# Patient Record
Sex: MALE | Race: WHITE | NOT HISPANIC OR LATINO | Employment: FULL TIME | URBAN - METROPOLITAN AREA
[De-identification: names, ages, dates, MRNs, and addresses within clinical notes are randomized per-mention and may not be internally consistent; named-entity substitution may affect disease eponyms.]

---

## 2019-04-25 ENCOUNTER — COMMUNICATION - HEALTHEAST (OUTPATIENT)
Dept: INTERNAL MEDICINE | Facility: CLINIC | Age: 44
End: 2019-04-25

## 2019-05-02 ENCOUNTER — OFFICE VISIT - HEALTHEAST (OUTPATIENT)
Dept: INTERNAL MEDICINE | Facility: CLINIC | Age: 44
End: 2019-05-02

## 2019-05-02 ENCOUNTER — COMMUNICATION - HEALTHEAST (OUTPATIENT)
Dept: TELEHEALTH | Facility: CLINIC | Age: 44
End: 2019-05-02

## 2019-05-02 DIAGNOSIS — E56.9 VITAMIN DEFICIENCY: ICD-10-CM

## 2019-05-02 DIAGNOSIS — R53.83 FATIGUE, UNSPECIFIED TYPE: ICD-10-CM

## 2019-05-02 DIAGNOSIS — E03.9 HYPOTHYROIDISM, UNSPECIFIED TYPE: ICD-10-CM

## 2019-05-02 DIAGNOSIS — M79.18 MUSCLE ACHE OF EXTREMITY: ICD-10-CM

## 2019-05-02 DIAGNOSIS — R73.01 ELEVATED FASTING BLOOD SUGAR: ICD-10-CM

## 2019-05-02 DIAGNOSIS — Z00.00 ANNUAL PHYSICAL EXAM: ICD-10-CM

## 2019-05-02 LAB
ALBUMIN SERPL-MCNC: 4.9 G/DL (ref 3.5–5)
ALBUMIN UR-MCNC: NEGATIVE MG/DL
ALP SERPL-CCNC: 42 U/L (ref 45–120)
ALT SERPL W P-5'-P-CCNC: 69 U/L (ref 0–45)
ANION GAP SERPL CALCULATED.3IONS-SCNC: 18 MMOL/L (ref 5–18)
APPEARANCE UR: CLEAR
AST SERPL W P-5'-P-CCNC: 26 U/L (ref 0–40)
BASOPHILS # BLD AUTO: 0 THOU/UL (ref 0–0.2)
BASOPHILS NFR BLD AUTO: 1 % (ref 0–2)
BILIRUB SERPL-MCNC: 0.5 MG/DL (ref 0–1)
BILIRUB UR QL STRIP: NEGATIVE
BUN SERPL-MCNC: 13 MG/DL (ref 8–22)
CALCIUM SERPL-MCNC: 10.7 MG/DL (ref 8.5–10.5)
CHLORIDE BLD-SCNC: 105 MMOL/L (ref 98–107)
CHOLEST SERPL-MCNC: 315 MG/DL
CO2 SERPL-SCNC: 18 MMOL/L (ref 22–31)
COLOR UR AUTO: YELLOW
CREAT SERPL-MCNC: 0.94 MG/DL (ref 0.7–1.3)
EOSINOPHIL # BLD AUTO: 0.1 THOU/UL (ref 0–0.4)
EOSINOPHIL NFR BLD AUTO: 1 % (ref 0–6)
ERYTHROCYTE [DISTWIDTH] IN BLOOD BY AUTOMATED COUNT: 13.2 % (ref 11–14.5)
FASTING STATUS PATIENT QL REPORTED: ABNORMAL
GFR SERPL CREATININE-BSD FRML MDRD: >60 ML/MIN/1.73M2
GLUCOSE BLD-MCNC: 110 MG/DL (ref 70–125)
GLUCOSE UR STRIP-MCNC: NEGATIVE MG/DL
HBA1C MFR BLD: 5.3 % (ref 3.5–6)
HCT VFR BLD AUTO: 46.5 % (ref 40–54)
HDLC SERPL-MCNC: 49 MG/DL
HGB BLD-MCNC: 15.6 G/DL (ref 14–18)
HGB UR QL STRIP: NEGATIVE
KETONES UR STRIP-MCNC: NEGATIVE MG/DL
LDLC SERPL CALC-MCNC: 237 MG/DL
LEUKOCYTE ESTERASE UR QL STRIP: NEGATIVE
LYMPHOCYTES # BLD AUTO: 1.4 THOU/UL (ref 0.8–4.4)
LYMPHOCYTES NFR BLD AUTO: 29 % (ref 20–40)
MAGNESIUM SERPL-MCNC: 2.1 MG/DL (ref 1.8–2.6)
MCH RBC QN AUTO: 27.7 PG (ref 27–34)
MCHC RBC AUTO-ENTMCNC: 33.5 G/DL (ref 32–36)
MCV RBC AUTO: 82 FL (ref 80–100)
MONOCYTES # BLD AUTO: 0.5 THOU/UL (ref 0–0.9)
MONOCYTES NFR BLD AUTO: 10 % (ref 2–10)
NEUTROPHILS # BLD AUTO: 2.8 THOU/UL (ref 2–7.7)
NEUTROPHILS NFR BLD AUTO: 59 % (ref 50–70)
NITRATE UR QL: NEGATIVE
PH UR STRIP: 7 [PH] (ref 5–8)
PLATELET # BLD AUTO: 274 THOU/UL (ref 140–440)
PMV BLD AUTO: 10.1 FL (ref 8.5–12.5)
POTASSIUM BLD-SCNC: 4.8 MMOL/L (ref 3.5–5)
PROT SERPL-MCNC: 7.8 G/DL (ref 6–8)
RBC # BLD AUTO: 5.64 MILL/UL (ref 4.4–6.2)
SODIUM SERPL-SCNC: 141 MMOL/L (ref 136–145)
SP GR UR STRIP: 1.01 (ref 1–1.03)
T3 SERPL-MCNC: 87 NG/DL (ref 45–175)
T4 FREE SERPL-MCNC: 0.9 NG/DL (ref 0.7–1.8)
T4 TOTAL - HISTORICAL: 7.4 UG/DL (ref 4.5–13)
TRIGL SERPL-MCNC: 144 MG/DL
TSH SERPL DL<=0.005 MIU/L-ACNC: 0.07 UIU/ML (ref 0.3–5)
UROBILINOGEN UR STRIP-ACNC: NORMAL
VIT B12 SERPL-MCNC: 1001 PG/ML (ref 213–816)
WBC: 4.8 THOU/UL (ref 4–11)

## 2019-05-02 ASSESSMENT — MIFFLIN-ST. JEOR: SCORE: 1771.13

## 2019-05-03 ENCOUNTER — COMMUNICATION - HEALTHEAST (OUTPATIENT)
Dept: INTERNAL MEDICINE | Facility: CLINIC | Age: 44
End: 2019-05-03

## 2019-05-03 ENCOUNTER — AMBULATORY - HEALTHEAST (OUTPATIENT)
Dept: INTERNAL MEDICINE | Facility: CLINIC | Age: 44
End: 2019-05-03

## 2019-05-03 DIAGNOSIS — E03.9 HYPOTHYROIDISM, UNSPECIFIED TYPE: ICD-10-CM

## 2019-05-03 DIAGNOSIS — E78.00 HYPERCHOLESTEREMIA: ICD-10-CM

## 2019-05-03 DIAGNOSIS — R79.89 ELEVATED LFTS: ICD-10-CM

## 2019-05-03 DIAGNOSIS — E83.52 SERUM CALCIUM ELEVATED: ICD-10-CM

## 2019-05-03 LAB
25(OH)D3 SERPL-MCNC: 19.9 NG/ML (ref 30–80)
25(OH)D3 SERPL-MCNC: 19.9 NG/ML (ref 30–80)

## 2019-07-15 ENCOUNTER — COMMUNICATION - HEALTHEAST (OUTPATIENT)
Dept: INTERNAL MEDICINE | Facility: CLINIC | Age: 44
End: 2019-07-15

## 2019-07-25 ENCOUNTER — AMBULATORY - HEALTHEAST (OUTPATIENT)
Dept: LAB | Facility: CLINIC | Age: 44
End: 2019-07-25

## 2019-07-25 DIAGNOSIS — E03.9 HYPOTHYROIDISM, UNSPECIFIED TYPE: ICD-10-CM

## 2019-07-25 DIAGNOSIS — R79.89 ELEVATED LFTS: ICD-10-CM

## 2019-07-25 DIAGNOSIS — E83.52 SERUM CALCIUM ELEVATED: ICD-10-CM

## 2019-07-25 DIAGNOSIS — E78.00 HYPERCHOLESTEREMIA: ICD-10-CM

## 2019-07-25 LAB
ALBUMIN SERPL-MCNC: 4.4 G/DL (ref 3.5–5)
ALP SERPL-CCNC: 57 U/L (ref 45–120)
ALT SERPL W P-5'-P-CCNC: 54 U/L (ref 0–45)
ANION GAP SERPL CALCULATED.3IONS-SCNC: 9 MMOL/L (ref 5–18)
AST SERPL W P-5'-P-CCNC: 48 U/L (ref 0–40)
BILIRUB SERPL-MCNC: 0.4 MG/DL (ref 0–1)
BUN SERPL-MCNC: 16 MG/DL (ref 8–22)
CALCIUM SERPL-MCNC: 9.7 MG/DL (ref 8.5–10.5)
CHLORIDE BLD-SCNC: 102 MMOL/L (ref 98–107)
CO2 SERPL-SCNC: 26 MMOL/L (ref 22–31)
CREAT SERPL-MCNC: 1 MG/DL (ref 0.7–1.3)
GFR SERPL CREATININE-BSD FRML MDRD: >60 ML/MIN/1.73M2
GLUCOSE BLD-MCNC: 114 MG/DL (ref 70–125)
LDLC SERPL CALC-MCNC: 209 MG/DL
POTASSIUM BLD-SCNC: 4.4 MMOL/L (ref 3.5–5)
PROT SERPL-MCNC: 7 G/DL (ref 6–8)
PTH-INTACT SERPL-MCNC: 86 PG/ML (ref 10–86)
SODIUM SERPL-SCNC: 137 MMOL/L (ref 136–145)
TSH SERPL DL<=0.005 MIU/L-ACNC: 1.5 UIU/ML (ref 0.3–5)

## 2019-10-19 ENCOUNTER — COMMUNICATION - HEALTHEAST (OUTPATIENT)
Dept: INTERNAL MEDICINE | Facility: CLINIC | Age: 44
End: 2019-10-19

## 2019-10-19 DIAGNOSIS — L30.9 DERMATITIS: ICD-10-CM

## 2019-11-24 ENCOUNTER — COMMUNICATION - HEALTHEAST (OUTPATIENT)
Dept: INTERNAL MEDICINE | Facility: CLINIC | Age: 44
End: 2019-11-24

## 2019-11-25 ENCOUNTER — AMBULATORY - HEALTHEAST (OUTPATIENT)
Dept: INTERNAL MEDICINE | Facility: CLINIC | Age: 44
End: 2019-11-25

## 2019-11-25 DIAGNOSIS — R06.83 SNORING: ICD-10-CM

## 2020-02-03 ENCOUNTER — COMMUNICATION - HEALTHEAST (OUTPATIENT)
Dept: INTERNAL MEDICINE | Facility: CLINIC | Age: 45
End: 2020-02-03

## 2020-02-07 ENCOUNTER — OFFICE VISIT - HEALTHEAST (OUTPATIENT)
Dept: INTERNAL MEDICINE | Facility: CLINIC | Age: 45
End: 2020-02-07

## 2020-02-07 DIAGNOSIS — M79.18 MUSCLE ACHE OF EXTREMITY: ICD-10-CM

## 2020-02-07 DIAGNOSIS — R79.89 LOW TESTOSTERONE IN MALE: ICD-10-CM

## 2020-02-07 DIAGNOSIS — R53.83 FATIGUE, UNSPECIFIED TYPE: ICD-10-CM

## 2020-02-07 DIAGNOSIS — E55.9 VITAMIN D DEFICIENCY: ICD-10-CM

## 2020-02-07 DIAGNOSIS — R73.01 ELEVATED FASTING BLOOD SUGAR: ICD-10-CM

## 2020-02-07 DIAGNOSIS — E03.9 HYPOTHYROIDISM, UNSPECIFIED TYPE: ICD-10-CM

## 2020-02-07 LAB
ALBUMIN SERPL-MCNC: 4.4 G/DL (ref 3.5–5)
ALP SERPL-CCNC: 59 U/L (ref 45–120)
ALT SERPL W P-5'-P-CCNC: 50 U/L (ref 0–45)
ANION GAP SERPL CALCULATED.3IONS-SCNC: 8 MMOL/L (ref 5–18)
AST SERPL W P-5'-P-CCNC: 28 U/L (ref 0–40)
BASOPHILS # BLD AUTO: 0 THOU/UL (ref 0–0.2)
BASOPHILS NFR BLD AUTO: 1 % (ref 0–2)
BILIRUB SERPL-MCNC: 0.4 MG/DL (ref 0–1)
BUN SERPL-MCNC: 17 MG/DL (ref 8–22)
C REACTIVE PROTEIN LHE: 0.2 MG/DL (ref 0–0.8)
CALCIUM SERPL-MCNC: 9.7 MG/DL (ref 8.5–10.5)
CHLORIDE BLD-SCNC: 102 MMOL/L (ref 98–107)
CO2 SERPL-SCNC: 29 MMOL/L (ref 22–31)
CREAT SERPL-MCNC: 0.85 MG/DL (ref 0.7–1.3)
EOSINOPHIL # BLD AUTO: 0.1 THOU/UL (ref 0–0.4)
EOSINOPHIL NFR BLD AUTO: 2 % (ref 0–6)
ERYTHROCYTE [DISTWIDTH] IN BLOOD BY AUTOMATED COUNT: 13.1 % (ref 11–14.5)
ERYTHROCYTE [SEDIMENTATION RATE] IN BLOOD BY WESTERGREN METHOD: 5 MM/HR (ref 0–15)
GFR SERPL CREATININE-BSD FRML MDRD: >60 ML/MIN/1.73M2
GLUCOSE BLD-MCNC: 97 MG/DL (ref 70–125)
HBA1C MFR BLD: 5.2 % (ref 3.5–6)
HCT VFR BLD AUTO: 47.9 % (ref 40–54)
HGB BLD-MCNC: 15.8 G/DL (ref 14–18)
LYMPHOCYTES # BLD AUTO: 2 THOU/UL (ref 0.8–4.4)
LYMPHOCYTES NFR BLD AUTO: 30 % (ref 20–40)
MAGNESIUM SERPL-MCNC: 2.1 MG/DL (ref 1.8–2.6)
MCH RBC QN AUTO: 27.5 PG (ref 27–34)
MCHC RBC AUTO-ENTMCNC: 33 G/DL (ref 32–36)
MCV RBC AUTO: 83 FL (ref 80–100)
MONOCYTES # BLD AUTO: 0.6 THOU/UL (ref 0–0.9)
MONOCYTES NFR BLD AUTO: 9 % (ref 2–10)
NEUTROPHILS # BLD AUTO: 3.9 THOU/UL (ref 2–7.7)
NEUTROPHILS NFR BLD AUTO: 59 % (ref 50–70)
PLATELET # BLD AUTO: 280 THOU/UL (ref 140–440)
PMV BLD AUTO: 10.3 FL (ref 8.5–12.5)
POTASSIUM BLD-SCNC: 4.6 MMOL/L (ref 3.5–5)
PROT SERPL-MCNC: 7.4 G/DL (ref 6–8)
RBC # BLD AUTO: 5.74 MILL/UL (ref 4.4–6.2)
SODIUM SERPL-SCNC: 139 MMOL/L (ref 136–145)
VIT B12 SERPL-MCNC: 840 PG/ML (ref 213–816)
WBC: 6.7 THOU/UL (ref 4–11)

## 2020-02-07 ASSESSMENT — MIFFLIN-ST. JEOR: SCORE: 1784.74

## 2020-02-10 ENCOUNTER — COMMUNICATION - HEALTHEAST (OUTPATIENT)
Dept: INTERNAL MEDICINE | Facility: CLINIC | Age: 45
End: 2020-02-10

## 2020-02-10 LAB
25(OH)D3 SERPL-MCNC: 23.8 NG/ML (ref 30–80)
25(OH)D3 SERPL-MCNC: 23.8 NG/ML (ref 30–80)

## 2020-03-03 ENCOUNTER — RECORDS - HEALTHEAST (OUTPATIENT)
Dept: ADMINISTRATIVE | Facility: OTHER | Age: 45
End: 2020-03-03

## 2020-06-05 ENCOUNTER — COMMUNICATION - HEALTHEAST (OUTPATIENT)
Dept: SLEEP MEDICINE | Facility: CLINIC | Age: 45
End: 2020-06-05

## 2020-06-17 ASSESSMENT — MIFFLIN-ST. JEOR: SCORE: 1789.27

## 2020-06-18 ENCOUNTER — OFFICE VISIT - HEALTHEAST (OUTPATIENT)
Dept: SLEEP MEDICINE | Facility: CLINIC | Age: 45
End: 2020-06-18

## 2020-06-18 DIAGNOSIS — R06.81 WITNESSED APNEIC SPELLS: ICD-10-CM

## 2020-06-18 DIAGNOSIS — R06.83 SNORING: ICD-10-CM

## 2020-06-18 DIAGNOSIS — G47.10 HYPERSOMNIA: ICD-10-CM

## 2020-06-18 DIAGNOSIS — R51.9 SLEEP RELATED HEADACHES: ICD-10-CM

## 2020-06-22 DIAGNOSIS — G47.10 HYPERSOMNIA: ICD-10-CM

## 2020-06-22 DIAGNOSIS — R06.83 SNORING: ICD-10-CM

## 2020-06-22 DIAGNOSIS — R51.9 SLEEP RELATED HEADACHES: ICD-10-CM

## 2020-06-22 DIAGNOSIS — R06.81 WITNESSED EPISODE OF APNEA: Primary | ICD-10-CM

## 2020-07-06 ENCOUNTER — OFFICE VISIT (OUTPATIENT)
Dept: SLEEP MEDICINE | Facility: CLINIC | Age: 45
End: 2020-07-06
Attending: INTERNAL MEDICINE
Payer: COMMERCIAL

## 2020-07-06 DIAGNOSIS — G47.10 HYPERSOMNIA: ICD-10-CM

## 2020-07-06 DIAGNOSIS — R51.9 SLEEP RELATED HEADACHES: ICD-10-CM

## 2020-07-06 DIAGNOSIS — R06.81 WITNESSED EPISODE OF APNEA: ICD-10-CM

## 2020-07-06 DIAGNOSIS — R06.83 SNORING: ICD-10-CM

## 2020-07-06 PROCEDURE — G0399 HOME SLEEP TEST/TYPE 3 PORTA: HCPCS | Performed by: INTERNAL MEDICINE

## 2020-07-07 ENCOUNTER — DOCUMENTATION ONLY (OUTPATIENT)
Dept: SLEEP MEDICINE | Facility: CLINIC | Age: 45
End: 2020-07-07
Attending: INTERNAL MEDICINE
Payer: COMMERCIAL

## 2020-07-07 NOTE — PROGRESS NOTES
This HSAT was performed using a Noxturnal T3 device which recorded snore, sound, movement activity, body position, nasal pressure, oronasal thermal airflow, pulse, oximetry and both chest and abdominal respiratory effort. HSAT data was restricted to the time patient states they were in bed.     HSAT was scored using 1B 4% hypopnea rule.     HST AHI (Non-PAT): 31.7  Snoring was reported as moderate and loud.  Time with SpO2 below 89% was 46.1 minutes.   Overall signal quality was good     Pt will follow up with sleep provider to determine appropriate therapy.      yes

## 2020-07-07 NOTE — PROGRESS NOTES
HST POST-STUDY QUESTIONNAIRE    1. What time did you go to bed?  10:30 pm  2. How long do you think it took to fall asleep?  50 minutes  3. What time did you wake up to start the day?  5:20 am  4. Did you get up during the night at all?  no  5. If you woke up, do you remember approximately what time(s)? Woke up may times  6. Did you have any difficulty with the equipment?  No  7. Did you us any type of treatment with this study?  None  8. Was the head of the bed elevated? No  9. Did you sleep in a recliner?  No  10. Did you stop using CPAP at least 3 days before this test?  NA  11. Any other information you'd like us to know? n/a

## 2020-07-08 ENCOUNTER — TELEPHONE (OUTPATIENT)
Dept: SLEEP MEDICINE | Facility: CLINIC | Age: 45
End: 2020-07-08

## 2020-07-08 NOTE — PROCEDURES
HOME SLEEP STUDY INTERPRETATION    Patient: Robert Garduno  MRN: [id number]  YOB: 1975  Study Date: 2020  Referring Provider: Natalie Hale;   Ordering Provider: Luis Hills DO     Indications for Home Study: Robert Garduno is a 44 year old male who presents with symptoms suggestive of obstructive sleep apnea.    There is no height or weight on file to calculate BMI.  [place] Sleepiness Scale: 10/24  STOP-BAN/8    Data: A full night home sleep study was performed recording the standard physiologic parameters including body position, movement, sound, nasal pressure, thermal oral airflow, chest and abdominal movements with respiratory inductance plethysmography, and oxygen saturation by pulse oximetry. Pulse rate was estimated by oximetry recording. This study was considered adequate based on > 4 hours of quality oximetry and respiratory recording. As specified by the AASM Manual for the Scoring of Sleep and Associated events, version 2.3, Rule VIII.D 1B, 4% oxygen desaturation scoring for hypopneas is used as a standard of care on all home sleep apnea testing.    Analysis Time:  452 minutes    Respiration:   Sleep Associated Hypoxemia: sustained hypoxemia was present. Baseline oxygen saturation was 92.6%.  Time with saturation less than or equal to 88% was 46.1 minutes. The lowest oxygen saturation was 69%.   Snoring: Snoring was present.  Respiratory events: The home study revealed a presence of 156 obstructive apneas and 22 mixed and central apneas. There were 61 hypopneas resulting in a combined apnea/hypopnea index [AHI] of 31.7 events per hour.  AHI was 47.5 per hour supine, 0 per hour prone, 10.5 per hour on left side, and 0 per hour on right side.   Pattern: Excluding events noted above, respiratory rate and pattern was Normal.    Position: Percent of time spent: supine - 61.5%, prone - 0%, on left - 23.9%, on right - 14.6%.    Heart Rate: By pulse oximetry normal rate was noted.      Assessment:   Severe obstructive sleep apnea.  Sleep associated hypoxemia was present.    Recommendations:  Consider auto-CPAP at 5-20 cmH2O.  Suggest optimizing sleep hygiene and avoiding sleep deprivation.  Weight management.    Diagnosis Code(s): Obstructive Sleep Apnea G47.33, Hypoxemia G47.36    Luis Hills DO, July 8, 2020   Diplomate, American Board of Internal Medicine, Sleep Medicine

## 2020-07-08 NOTE — TELEPHONE ENCOUNTER
Please call patient to schedule for follow up visit to discuss results of sleep study over the phone.

## 2020-07-08 NOTE — PROGRESS NOTES
HOME SLEEP STUDY INTERPRETATION    Patient: Robert Garduno  MRN: [id number]  YOB: 1975  Study Date: 2020  Referring Provider: Natalie Hale;   Ordering Provider: Luis Hills DO     Indications for Home Study: Robert Garduno is a 44 year old male who presents with symptoms suggestive of obstructive sleep apnea.    There is no height or weight on file to calculate BMI.  [place] Sleepiness Scale: 10/24  STOP-BAN/8    Data: A full night home sleep study was performed recording the standard physiologic parameters including body position, movement, sound, nasal pressure, thermal oral airflow, chest and abdominal movements with respiratory inductance plethysmography, and oxygen saturation by pulse oximetry. Pulse rate was estimated by oximetry recording. This study was considered adequate based on > 4 hours of quality oximetry and respiratory recording. As specified by the AASM Manual for the Scoring of Sleep and Associated events, version 2.3, Rule VIII.D 1B, 4% oxygen desaturation scoring for hypopneas is used as a standard of care on all home sleep apnea testing.    Analysis Time:  452 minutes    Respiration:   Sleep Associated Hypoxemia: sustained hypoxemia was present. Baseline oxygen saturation was 92.6%.  Time with saturation less than or equal to 88% was 46.1 minutes. The lowest oxygen saturation was 69%.   Snoring: Snoring was present.  Respiratory events: The home study revealed a presence of 156 obstructive apneas and 22 mixed and central apneas. There were 61 hypopneas resulting in a combined apnea/hypopnea index [AHI] of 31.7 events per hour.  AHI was 47.5 per hour supine, 0 per hour prone, 10.5 per hour on left side, and 0 per hour on right side.   Pattern: Excluding events noted above, respiratory rate and pattern was Normal.    Position: Percent of time spent: supine - 61.5%, prone - 0%, on left - 23.9%, on right - 14.6%.    Heart Rate: By pulse oximetry normal rate was noted.      Assessment:   Severe obstructive sleep apnea.  Sleep associated hypoxemia was present.    Recommendations:  Consider auto-CPAP at 5-20 cmH2O.  Suggest optimizing sleep hygiene and avoiding sleep deprivation.  Weight management.    Diagnosis Code(s): Obstructive Sleep Apnea G47.33, Hypoxemia G47.36    Luis Hills DO, July 8, 2020   Diplomate, American Board of Internal Medicine, Sleep Medicine

## 2020-07-08 NOTE — PROGRESS NOTES
Attempted to call the patient to inform him of the results. Left message to call back. Will have my staff contact patient to schedule for follow up.

## 2020-07-17 RX ORDER — LEVOTHYROXINE SODIUM 175 UG/1
175 TABLET ORAL
COMMUNITY
Start: 2020-02-07 | End: 2022-04-28

## 2020-07-17 NOTE — PROGRESS NOTES
"Robert Garduno is a 44 year old male who is being evaluated via a billable video visit.      The patient has been notified of following:     \"This video visit will be conducted via a call between you and your physician/provider. We have found that certain health care needs can be provided without the need for an in-person physical exam.  This service lets us provide the care you need with a video conversation.  If a prescription is necessary we can send it directly to your pharmacy.  If lab work is needed we can place an order for that and you can then stop by our lab to have the test done at a later time.    Video visits are billed at different rates depending on your insurance coverage.  Please reach out to your insurance provider with any questions.    If during the course of the call the physician/provider feels a video visit is not appropriate, you will not be charged for this service.\"    Patient has given verbal consent for Video visit? Yes  How would you like to obtain your AVS? MyChart  If you are dropped from the video visit, the video invite should be resent to: MyChart  Will anyone else be joining your video visit? No        Video-Visit Details    Type of service:  Video Visit    Originating Location (pt. Location): Home    Distant Location (provider location):  [facility]     Platform used for Video Visit: Umer Archuleta CMA on 7/17/2020 at 8:45 AM      Thank you for the opportunity to participate in the care of Robert Garduno.     He is a 44 year old  male patient who comes to the sleep medicine clinic for review of his sleep study. The study was completed on 07/06/20 which showed that the patient had severe obstructive sleep apnea with an apnea hypopnea index of 31.7 events per hour with the lowest O2 Sat of 69%.    Assessment and Plan:  In summary Robert Garduno is a 44 year old year old male here for review of his sleep study.  1. Obstructive Sleep Apnea  We had " an extensive conversation to review the results of his sleep study and to  him on the importance of treating sleep apnea. Patient decided to proceed with CPAP. He will start using the device as soon as he receives it with the intention to use if for the entire night. We discussed some tips to increase PAP tolerance as well as the normal curve of adaptation. CPAP is going to provide improved respiratory function during the night but it can cause some sleep disruption that tends to improve with continuous usage. He should return to the clinic in 6 weeks to review compliance and efficacy monitoring. The patient will call us back with his DME of choice. The prescription is in the chart.    Sleep-Wake Cycle:    The patient likes to initiate sleep at around 10PM with a sleep latency of less than 10 minutes. The patient has 1 nocturnal awakenings. Final wake up time is around 5:45AM.    Current Outpatient Medications   Medication Sig Dispense Refill     levothyroxine (SYNTHROID/LEVOTHROID) 175 MCG tablet Take 175 mcg by mouth       Multiple Vitamins-Minerals (VITAMIN D3 COMPLETE PO)          No Known Allergies    No flowsheet data found.    Physical Exam:  There were no vitals taken for this visit.  BMI:There is no height or weight on file to calculate BMI.   GEN: NAD  Psych: normal mood, normal affect     Labs/Studies:  - We reviewed the results of the overnight PSG as described on the HPI.     No components found for: FERRITIN      Patient verbalized understanding of these issues, agrees with the plan and all questions were answered today. Patient was given an opportuntity to voice any other symptoms or concerns not listed above. Patient did not have any other symptoms or concerns.        Luis Hills DO  Board Certified in Internal Medicine and Sleep Medicine    We spent a total of 16 minutes of face-to-face encounter and more than 50% of the encounter was used for counseling or coordination of care.

## 2020-07-20 ENCOUNTER — VIRTUAL VISIT (OUTPATIENT)
Dept: SLEEP MEDICINE | Facility: CLINIC | Age: 45
End: 2020-07-20
Payer: COMMERCIAL

## 2020-07-20 DIAGNOSIS — G47.33 OBSTRUCTIVE SLEEP APNEA: Primary | ICD-10-CM

## 2020-07-20 DIAGNOSIS — G47.10 HYPERSOMNIA: ICD-10-CM

## 2020-07-20 PROCEDURE — 99213 OFFICE O/P EST LOW 20 MIN: CPT | Mod: 95 | Performed by: INTERNAL MEDICINE

## 2020-07-20 NOTE — PATIENT INSTRUCTIONS
"What is sleep apnea?    Sleep apnea is a condition that makes you stop breathing for short periods while you are asleep. There are 2 types of sleep apnea. One is called \"obstructive sleep apnea,\" and the other is called \"central sleep apnea.\"  In obstructive sleep apnea, you stop breathing because your throat narrows or closes. In central sleep apnea, you stop breathing because your brain does not send the right signals to your muscles to make you breathe. When people talk about sleep apnea, they are usually referring to obstructive sleep apnea, which is what this article is about.  People with sleep apnea do not know that they stop breathing when they are asleep. But they do sometimes wake up startled or gasping for breath. They also often hear from loved ones that they snore.  What are the symptoms of sleep apnea? -- The main symptoms of sleep apnea are loud snoring, tiredness, and daytime sleepiness. Other symptoms can include:  ?Restless sleep  ?Waking up choking or gasping  ?Morning headaches, dry mouth, or sore throat  ?Waking up often to urinate  ?Waking up feeling unrested or groggy  ?Trouble thinking clearly or remembering things  Some people with sleep apnea don't have symptoms, or they don't know they have them. They might figure that it's normal to be tired or to snore a lot.  Should I see a doctor or nurse? -- Yes. If you think you might have sleep apnea, see your doctor.  Is there a test for sleep apnea? -- Yes. If your doctor or nurse suspects you have sleep apnea, he or she might send you for a \"sleep study.\" Sleep studies can sometimes be done at home, but they are usually done in a sleep lab. For the study, you spend the night in the lab, and you are hooked up to different machines that monitor your heart rate, breathing, and other body functions. The results of the test will tell your doctor or nurse if you have the disorder.  Is there anything I can do on my own to help my sleep apnea? -- Yes. " "Here are some things that might help:  ?Stay off your back when sleeping. (This is not always practical, because people cannot control their position while asleep. Plus, it only helps some people.)  ?Lose weight, if you are overweight  ?Avoid alcohol, because it can make sleep apnea worse  How is sleep apnea treated? -- The most effective treatment for sleep apnea is a device that keeps your airway open while you sleep. Treatment with this device is called \"continuous positive airway pressure,\" or CPAP. People getting CPAP wear a face mask at night that keeps them breathing.  If your doctor or nurse recommends a CPAP machine, try to be patient about using it. The mask might seem uncomfortable to wear at first, and the machine might seem noisy, but using the machine can really pay off. People with sleep apnea who use a CPAP machine feel more rested and generally feel better.  There is also another device that you wear in your mouth called an \"oral appliance\" or \"mandibular advancement device.\" It also helps keep your airway open while you sleep.  In rare cases, when nothing else helps, doctors recommend surgery to keep the airway open. Surgery to do this is not always effective, and even when it is, the problem can come back.  Is sleep apnea dangerous? -- It can be. People with sleep apnea do not get good-quality sleep, so they are often tired and not alert. This puts them at risk for car accidents and other types of accidents. Plus, studies show that people with sleep apnea are more likely than others to have high blood pressure, heart attacks, and other serious heart problems. In people with severe sleep apnea, getting treated (for example, with a CPAP machine) can help prevent some of these problems.  Normally when a person sleeps, the airway remains open, and air can pass from the nose and mouth to the lungs. In a person with sleep apnea, parts of the throat and mouth drop into the airway and block off the flow " of air. This can cause loud snoring and interrupt breathing for short periods.    The CPAP mask gently blows air into your nose while you sleep. It puts just enough pressure on your airway to keep it from closing. The mask in this picture fits over just the nose. Other CPAP devices have masks that fit over the nose and mouth.

## 2020-08-19 ENCOUNTER — COMMUNICATION - HEALTHEAST (OUTPATIENT)
Dept: INTERNAL MEDICINE | Facility: CLINIC | Age: 45
End: 2020-08-19

## 2020-08-19 DIAGNOSIS — E03.9 HYPOTHYROIDISM, UNSPECIFIED TYPE: ICD-10-CM

## 2020-08-24 ENCOUNTER — AMBULATORY - HEALTHEAST (OUTPATIENT)
Dept: OTHER | Facility: CLINIC | Age: 45
End: 2020-08-24

## 2020-09-13 ENCOUNTER — COMMUNICATION - HEALTHEAST (OUTPATIENT)
Dept: INTERNAL MEDICINE | Facility: CLINIC | Age: 45
End: 2020-09-13

## 2020-09-13 DIAGNOSIS — E03.9 HYPOTHYROIDISM, UNSPECIFIED TYPE: ICD-10-CM

## 2020-09-15 ENCOUNTER — MYC MEDICAL ADVICE (OUTPATIENT)
Dept: SLEEP MEDICINE | Facility: CLINIC | Age: 45
End: 2020-09-15

## 2020-09-15 ASSESSMENT — SLEEP AND FATIGUE QUESTIONNAIRES
HOW LIKELY ARE YOU TO NOD OFF OR FALL ASLEEP WHILE SITTING AND READING: SLIGHT CHANCE OF DOZING
HOW LIKELY ARE YOU TO NOD OFF OR FALL ASLEEP IN A CAR, WHILE STOPPED FOR A FEW MINUTES IN TRAFFIC: WOULD NEVER DOZE
HOW LIKELY ARE YOU TO NOD OFF OR FALL ASLEEP WHILE WATCHING TV: SLIGHT CHANCE OF DOZING
HOW LIKELY ARE YOU TO NOD OFF OR FALL ASLEEP WHILE SITTING AND TALKING TO SOMEONE: WOULD NEVER DOZE
HOW LIKELY ARE YOU TO NOD OFF OR FALL ASLEEP WHILE SITTING QUIETLY AFTER LUNCH WITHOUT ALCOHOL: SLIGHT CHANCE OF DOZING
HOW LIKELY ARE YOU TO NOD OFF OR FALL ASLEEP WHEN YOU ARE A PASSENGER IN A CAR FOR AN HOUR WITHOUT A BREAK: WOULD NEVER DOZE
HOW LIKELY ARE YOU TO NOD OFF OR FALL ASLEEP WHILE SITTING INACTIVE IN A PUBLIC PLACE: SLIGHT CHANCE OF DOZING
HOW LIKELY ARE YOU TO NOD OFF OR FALL ASLEEP WHILE LYING DOWN TO REST IN THE AFTERNOON WHEN CIRCUMSTANCES PERMIT: SLIGHT CHANCE OF DOZING

## 2020-09-17 VITALS — WEIGHT: 207 LBS | BODY MASS INDEX: 32.49 KG/M2 | HEIGHT: 67 IN

## 2020-09-17 ASSESSMENT — MIFFLIN-ST. JEOR: SCORE: 1787.58

## 2020-09-17 NOTE — PATIENT INSTRUCTIONS
Your BMI is Body mass index is 32.42 kg/m .  Weight management is a personal decision.  If you are interested in exploring weight loss strategies, the following discussion covers the approaches that may be successful. Body mass index (BMI) is one way to tell whether you are at a healthy weight, overweight, or obese. It measures your weight in relation to your height.  A BMI of 18.5 to 24.9 is in the healthy range. A person with a BMI of 25 to 29.9 is considered overweight, and someone with a BMI of 30 or greater is considered obese. More than two-thirds of American adults are considered overweight or obese.  Being overweight or obese increases the risk for further weight gain. Excess weight may lead to heart disease and diabetes.  Creating and following plans for healthy eating and physical activity may help you improve your health.  Weight control is part of healthy lifestyle and includes exercise, emotional health, and healthy eating habits. Careful eating habits lifelong are the mainstay of weight control. Though there are significant health benefits from weight loss, long-term weight loss with diet alone may be very difficult to achieve- studies show long-term success with dietary management in less than 10% of people. Attaining a healthy weight may be especially difficult to achieve in those with severe obesity. In some cases, medications, devices and surgical management might be considered.  What can you do?  If you are overweight or obese and are interested in methods for weight loss, you should discuss this with your provider.     Consider reducing daily calorie intake by 500 calories.     Keep a food journal.     Avoiding skipping meals, consider cutting portions instead.    Diet combined with exercise helps maintain muscle while optimizing fat loss. Strength training is particularly important for building and maintaining muscle mass. Exercise helps reduce stress, increase energy, and improves fitness.  Increasing exercise without diet control, however, may not burn enough calories to loose weight.       Start walking three days a week 10-20 minutes at a time    Work towards walking thirty minutes five days a week     Eventually, increase the speed of your walking for 1-2 minutes at time    In addition, we recommend that you review healthy lifestyles and methods for weight loss available through the National Institutes of Health patient information sites:  http://win.niddk.nih.gov/publications/index.htm    And look into health and wellness programs that may be available through your health insurance provider, employer, local community center, or jacky club.    Weight management plan: Discussed healthy diet and exercise guidelines

## 2020-09-17 NOTE — PROGRESS NOTES
"Robert Garduno is a 45 year old male who is being evaluated via a billable video visit.      The patient has been notified of following:     \"This video visit will be conducted via a call between you and your physician/provider. We have found that certain health care needs can be provided without the need for an in-person physical exam.  This service lets us provide the care you need with a video conversation.  If a prescription is necessary we can send it directly to your pharmacy.  If lab work is needed we can place an order for that and you can then stop by our lab to have the test done at a later time.    Video visits are billed at different rates depending on your insurance coverage.  Please reach out to your insurance provider with any questions.    If during the course of the call the physician/provider feels a video visit is not appropriate, you will not be charged for this service.\"    Patient has given verbal consent for Video visit? Yes  How would you like to obtain your AVS? MyChart  If you are dropped from the video visit, the video invite should be resent to: Text to cell phone: [phone]   Will anyone else be joining your video visit? No    Video-Visit Details    Type of service:  Video Visit    Originating Location (pt. Location): Home    Distant Location (provider location):  [facility]     Platform used for Video Visit: Umer    Thank you for the opportunity to participate in the care of Robert Garduno.     He is a 45 year old y/o male patient who comes to the sleep medicine clinic for follow up.  The patient states that since starting CPAP, his sleep quality and energy level have dramatically improved.  His sleep related headaches have now mostly resolved.  He is very happy with CPAP usage.       Assessment and Plan:  In summary Robert Garduno is a 45 year old year old male who is here for follow-up.    1. Obstructive sleep apnea  I congratulated the patient on his " excellent CPAP usage.  I will narrow his CPAP pressure setting to 7-10 CWP.  I welcomed the patient to follow-up with me every 2 years.  - COMPREHENSIVE DME      Compliance Download data for 30 days:  Pressure setting: APAP 5-15 CWP  95% pressure: 7.4 CWP  Residual AHI: 0.6 events per hour  Leak: Minimal  Compliance: 100%  Mask Tolerance: Good  Skin irritation: None  DME: [facility]    Past Medical History:   Diagnosis Date     Hypothyroidism      ARSEN (obstructive sleep apnea)        History reviewed. No pertinent surgical history.    Social History     Socioeconomic History     Marital status:      Spouse name: Not on file     Number of children: Not on file     Years of education: Not on file     Highest education level: Not on file   Occupational History     Not on file   Social Needs     Financial resource strain: Not on file     Food insecurity     Worry: Not on file     Inability: Not on file     Transportation needs     Medical: Not on file     Non-medical: Not on file   Tobacco Use     Smoking status: Not on file   Substance and Sexual Activity     Alcohol use: Not on file     Drug use: Not on file     Sexual activity: Not on file   Lifestyle     Physical activity     Days per week: Not on file     Minutes per session: Not on file     Stress: Not on file   Relationships     Social connections     Talks on phone: Not on file     Gets together: Not on file     Attends [employer] service: Not on file     Active member of club or organization: Not on file     Attends meetings of clubs or organizations: Not on file     Relationship status: Not on file     Intimate partner violence     Fear of current or ex partner: Not on file     Emotionally abused: Not on file     Physically abused: Not on file     Forced sexual activity: Not on file   Other Topics Concern     Not on file   Social History Narrative     Not on file       Current Outpatient Medications   Medication Sig Dispense Refill      "levothyroxine (SYNTHROID/LEVOTHROID) 175 MCG tablet Take 175 mcg by mouth       Multiple Vitamins-Minerals (VITAMIN D3 COMPLETE PO)          No Known Allergies    No flowsheet data found.    Physical Exam:  Ht 1.702 m (5' 7\")   Wt 93.9 kg (207 lb)   BMI 32.42 kg/m    BMI:Body mass index is 32.42 kg/m .   GEN: NAD,  Psych: normal mood, normal affect    Labs/Studies:    I reviewed the efficacy and compliance report from his device. Data summarized on the HPI and the PAP compliance flow sheet.        Patient verbalized understanding of these issues, agrees with the plan and all questions were answered today. Patient was given an opportuntity to voice any other symptoms or concerns not listed above. Patient did not have any other symptoms or concerns.      Luis Hills DO  Board Certified in Internal Medicine and Sleep Medicine    (Note created with Dragon voice recognition and unintended spelling errors and word substitutions may occur)     I spent a total of 15 minutes of face-to-face encounter and in preparation for this clinic visit.    Audio and visual devices were used for this virtual clinic visit with permission from patient.        "

## 2020-09-18 ENCOUNTER — VIRTUAL VISIT (OUTPATIENT)
Dept: SLEEP MEDICINE | Facility: CLINIC | Age: 45
End: 2020-09-18
Payer: COMMERCIAL

## 2020-09-18 DIAGNOSIS — G47.33 OBSTRUCTIVE SLEEP APNEA: Primary | ICD-10-CM

## 2020-09-18 PROCEDURE — 99213 OFFICE O/P EST LOW 20 MIN: CPT | Mod: 95 | Performed by: INTERNAL MEDICINE

## 2020-09-24 ENCOUNTER — OFFICE VISIT - HEALTHEAST (OUTPATIENT)
Dept: INTERNAL MEDICINE | Facility: CLINIC | Age: 45
End: 2020-09-24

## 2020-09-24 DIAGNOSIS — G47.30 SLEEP APNEA, UNSPECIFIED TYPE: ICD-10-CM

## 2020-09-24 DIAGNOSIS — E78.00 HYPERCHOLESTEREMIA: ICD-10-CM

## 2020-09-24 DIAGNOSIS — E83.52 SERUM CALCIUM ELEVATED: ICD-10-CM

## 2020-09-24 DIAGNOSIS — E03.9 HYPOTHYROIDISM, UNSPECIFIED TYPE: ICD-10-CM

## 2020-09-24 DIAGNOSIS — R73.01 ELEVATED FASTING BLOOD SUGAR: ICD-10-CM

## 2020-09-24 DIAGNOSIS — E55.9 VITAMIN D DEFICIENCY: ICD-10-CM

## 2020-09-24 DIAGNOSIS — R79.89 ELEVATED LFTS: ICD-10-CM

## 2020-09-28 ENCOUNTER — AMBULATORY - HEALTHEAST (OUTPATIENT)
Dept: LAB | Facility: CLINIC | Age: 45
End: 2020-09-28

## 2020-09-28 ENCOUNTER — COMMUNICATION - HEALTHEAST (OUTPATIENT)
Dept: INTERNAL MEDICINE | Facility: CLINIC | Age: 45
End: 2020-09-28

## 2020-09-28 DIAGNOSIS — R73.01 ELEVATED FASTING BLOOD SUGAR: ICD-10-CM

## 2020-09-28 DIAGNOSIS — R79.89 ELEVATED LFTS: ICD-10-CM

## 2020-09-28 DIAGNOSIS — E55.9 VITAMIN D DEFICIENCY: ICD-10-CM

## 2020-09-28 DIAGNOSIS — E03.9 HYPOTHYROIDISM, UNSPECIFIED TYPE: ICD-10-CM

## 2020-09-28 DIAGNOSIS — E78.00 HYPERCHOLESTEREMIA: ICD-10-CM

## 2020-09-28 DIAGNOSIS — G47.30 SLEEP APNEA, UNSPECIFIED TYPE: ICD-10-CM

## 2020-09-28 DIAGNOSIS — E83.52 SERUM CALCIUM ELEVATED: ICD-10-CM

## 2020-09-28 LAB
ALBUMIN SERPL-MCNC: 4.5 G/DL (ref 3.5–5)
ALP SERPL-CCNC: 48 U/L (ref 45–120)
ALT SERPL W P-5'-P-CCNC: 43 U/L (ref 0–45)
ANION GAP SERPL CALCULATED.3IONS-SCNC: 9 MMOL/L (ref 5–18)
AST SERPL W P-5'-P-CCNC: 25 U/L (ref 0–40)
BASOPHILS # BLD AUTO: 0.1 THOU/UL (ref 0–0.2)
BASOPHILS NFR BLD AUTO: 1 % (ref 0–2)
BILIRUB SERPL-MCNC: 0.6 MG/DL (ref 0–1)
BUN SERPL-MCNC: 16 MG/DL (ref 8–22)
CALCIUM SERPL-MCNC: 9.6 MG/DL (ref 8.5–10.5)
CHLORIDE BLD-SCNC: 103 MMOL/L (ref 98–107)
CHOLEST SERPL-MCNC: 290 MG/DL
CO2 SERPL-SCNC: 25 MMOL/L (ref 22–31)
CREAT SERPL-MCNC: 1.02 MG/DL (ref 0.7–1.3)
EOSINOPHIL # BLD AUTO: 0.2 THOU/UL (ref 0–0.4)
EOSINOPHIL NFR BLD AUTO: 2 % (ref 0–6)
ERYTHROCYTE [DISTWIDTH] IN BLOOD BY AUTOMATED COUNT: 13.4 % (ref 11–14.5)
FASTING STATUS PATIENT QL REPORTED: YES
GFR SERPL CREATININE-BSD FRML MDRD: >60 ML/MIN/1.73M2
GLUCOSE BLD-MCNC: 112 MG/DL (ref 70–125)
HBA1C MFR BLD: 5.4 %
HCT VFR BLD AUTO: 44.9 % (ref 40–54)
HDLC SERPL-MCNC: 42 MG/DL
HGB BLD-MCNC: 14.8 G/DL (ref 14–18)
IMM GRANULOCYTES # BLD: 0 THOU/UL
IMM GRANULOCYTES NFR BLD: 0 %
LDLC SERPL CALC-MCNC: 218 MG/DL
LYMPHOCYTES # BLD AUTO: 2.1 THOU/UL (ref 0.8–4.4)
LYMPHOCYTES NFR BLD AUTO: 32 % (ref 20–40)
MCH RBC QN AUTO: 27.9 PG (ref 27–34)
MCHC RBC AUTO-ENTMCNC: 33 G/DL (ref 32–36)
MCV RBC AUTO: 85 FL (ref 80–100)
MONOCYTES # BLD AUTO: 0.7 THOU/UL (ref 0–0.9)
MONOCYTES NFR BLD AUTO: 11 % (ref 2–10)
NEUTROPHILS # BLD AUTO: 3.5 THOU/UL (ref 2–7.7)
NEUTROPHILS NFR BLD AUTO: 54 % (ref 50–70)
PLATELET # BLD AUTO: 250 THOU/UL (ref 140–440)
PMV BLD AUTO: 10.6 FL (ref 8.5–12.5)
POTASSIUM BLD-SCNC: 4.6 MMOL/L (ref 3.5–5)
PROT SERPL-MCNC: 7.1 G/DL (ref 6–8)
PTH-INTACT SERPL-MCNC: 62 PG/ML (ref 10–86)
RBC # BLD AUTO: 5.31 MILL/UL (ref 4.4–6.2)
SODIUM SERPL-SCNC: 137 MMOL/L (ref 136–145)
TRIGL SERPL-MCNC: 149 MG/DL
TSH SERPL DL<=0.005 MIU/L-ACNC: 2.85 UIU/ML (ref 0.3–5)
WBC: 6.6 THOU/UL (ref 4–11)

## 2020-09-29 ENCOUNTER — AMBULATORY - HEALTHEAST (OUTPATIENT)
Dept: INTERNAL MEDICINE | Facility: CLINIC | Age: 45
End: 2020-09-29

## 2020-09-29 DIAGNOSIS — E03.9 HYPOTHYROIDISM, UNSPECIFIED TYPE: ICD-10-CM

## 2020-09-29 LAB
25(OH)D3 SERPL-MCNC: 30.6 NG/ML (ref 30–80)
25(OH)D3 SERPL-MCNC: 30.6 NG/ML (ref 30–80)

## 2020-11-21 ENCOUNTER — COMMUNICATION - HEALTHEAST (OUTPATIENT)
Dept: INTERNAL MEDICINE | Facility: CLINIC | Age: 45
End: 2020-11-21

## 2020-11-21 DIAGNOSIS — M79.18 MUSCLE ACHE OF EXTREMITY: ICD-10-CM

## 2020-11-21 DIAGNOSIS — E03.9 HYPOTHYROIDISM, UNSPECIFIED TYPE: ICD-10-CM

## 2020-12-09 ENCOUNTER — AMBULATORY - HEALTHEAST (OUTPATIENT)
Dept: LAB | Facility: CLINIC | Age: 45
End: 2020-12-09

## 2020-12-09 DIAGNOSIS — E03.9 HYPOTHYROIDISM, UNSPECIFIED TYPE: ICD-10-CM

## 2020-12-09 DIAGNOSIS — M79.18 MUSCLE ACHE OF EXTREMITY: ICD-10-CM

## 2020-12-09 LAB
C REACTIVE PROTEIN LHE: 0.1 MG/DL (ref 0–0.8)
ERYTHROCYTE [SEDIMENTATION RATE] IN BLOOD BY WESTERGREN METHOD: 5 MM/HR (ref 0–15)
RHEUMATOID FACT SERPL-ACNC: <15 IU/ML (ref 0–30)
TSH SERPL DL<=0.005 MIU/L-ACNC: 0.73 UIU/ML (ref 0.3–5)

## 2020-12-10 LAB — ANA SER QL: 0.4 U

## 2020-12-14 ENCOUNTER — HEALTH MAINTENANCE LETTER (OUTPATIENT)
Age: 45
End: 2020-12-14

## 2021-01-25 ENCOUNTER — COMMUNICATION - HEALTHEAST (OUTPATIENT)
Dept: INTERNAL MEDICINE | Facility: CLINIC | Age: 46
End: 2021-01-25

## 2021-01-25 DIAGNOSIS — E03.9 HYPOTHYROIDISM, UNSPECIFIED TYPE: ICD-10-CM

## 2021-03-11 ENCOUNTER — COMMUNICATION - HEALTHEAST (OUTPATIENT)
Dept: INTERNAL MEDICINE | Facility: CLINIC | Age: 46
End: 2021-03-11

## 2021-03-11 DIAGNOSIS — M79.18 MUSCLE ACHE OF EXTREMITY: ICD-10-CM

## 2021-03-11 DIAGNOSIS — M25.50 MULTIPLE JOINT PAIN: ICD-10-CM

## 2021-03-18 ENCOUNTER — OFFICE VISIT - HEALTHEAST (OUTPATIENT)
Dept: INTERNAL MEDICINE | Facility: CLINIC | Age: 46
End: 2021-03-18

## 2021-03-18 DIAGNOSIS — M25.50 MULTIPLE JOINT PAIN: ICD-10-CM

## 2021-03-18 DIAGNOSIS — E03.9 HYPOTHYROIDISM, UNSPECIFIED TYPE: ICD-10-CM

## 2021-03-18 DIAGNOSIS — R23.8 SCALP IRRITATION: ICD-10-CM

## 2021-03-18 LAB
T4 FREE SERPL-MCNC: 1.3 NG/DL (ref 0.7–1.8)
TSH SERPL DL<=0.005 MIU/L-ACNC: 0.25 UIU/ML (ref 0.3–5)
URATE SERPL-MCNC: 5.2 MG/DL (ref 3–8)

## 2021-03-18 ASSESSMENT — MIFFLIN-ST. JEOR: SCORE: 1879.99

## 2021-04-01 ENCOUNTER — COMMUNICATION - HEALTHEAST (OUTPATIENT)
Dept: INTERNAL MEDICINE | Facility: CLINIC | Age: 46
End: 2021-04-01

## 2021-05-28 NOTE — PROGRESS NOTES
Assessment/Plan:     1. Hypothyroidism, unspecified type  Discussion was had with patient about referral to endocrinology he would like to complete his TSH, T3 and T4  - Thyroid Stimulating Hormone (TSH)  - T4, Free  - T3, Total  - T4, Total    2. Fatigue, unspecified type  - Comprehensive Metabolic Panel  - HM1(CBC and Differential)  - HM1 (CBC with Diff)    3. Vitamin deficiency  - Vitamin B12  - Vitamin D, Total (25-Hydroxy)    4. Muscle ache of extremity  - Magnesium  - Vitamin B12    5. Annual physical exam  - Lipid [place] FASTING  - Urinalysis-UC if Indicated  Recommend follow-up in 6 months, sooner if needed    6. Elevated fasting blood sugar  Patient reports a history of elevated fasting glucose we will obtain a hemoglobin A1c today  - Glycosylated Hemoglobin A1c       I recommended he eat a healthy diet and exercise on a regular basis.      Subjective:     Robert Garduno is a 43 y.o. male who presents for an annual exam. Presents for an annual wellness exam and to establish care.  Patient has a history of Hashimoto's.  Previously prescribed [place] Thyroid 130 mg daily. Now utilizing Bovine desiccated thyroid 300mcg.  Patient reports having some increase in fatigue, weight gain.  He has been seen by endocrinology last visit in 2014 has been seen by the [facility] as well.  He states that he feels best when his TSH is between 1.5 and 2.0.    Muscle aches/pain and muscle fatigue and has been seen by Rheumatology.  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    Patient reports a history of low testerosterone.  He states he has declined treatment in the past due to side effects of medications    The patient reports that there is not domestic violence in his life.     Healthy Habits:   Regular Exercise: Yes  Sunscreen Use: Yes  Healthy Diet: Yes  Dental Visits Regularly: Yes  Sexually active: No  Colonoscopy: N/A      Immunization History   Administered Date(s) Administered     MMR 10/03/1995     Td, Adult,  Absorbed 07/04/1995, 08/17/2007     Tdap 08/01/2007, 01/26/2019     Immunization status: up to date and documented.        Current Outpatient Medications   Medication Sig Dispense Refill     biotin 10,000 mcg cap Take by mouth.       NON FORMULARY Take 300 mcg by mouth daily. Bovine desiccated thyroid              zinc 50 mg Tab Take by mouth.       No current facility-administered medications for this visit.      History reviewed. No pertinent past medical history.  History reviewed. No pertinent surgical history.  Patient has no known allergies.  Family History   Problem Relation Age of Onset     Alcohol abuse Mother      Hypothyroidism Mother      Hypothyroidism Father      Hypertension Father      Hypothyroidism Sister      Multiple sclerosis Brother      Social History     Socioeconomic History     Marital status:      Spouse name: Not on file     Number of children: Not on file     Years of education: Not on file     Highest education level: Not on file   Occupational History     Not on file   Social Needs     Financial resource strain: Not on file     Food insecurity:     Worry: Not on file     Inability: Not on file     Transportation needs:     Medical: Not on file     Non-medical: Not on file   Tobacco Use     Smoking status: Never Smoker     Smokeless tobacco: Never Used   Substance and Sexual Activity     Alcohol use: Yes     Comment: rare     Drug use: Never     Sexual activity: Not Currently   Lifestyle     Physical activity:     Days per week: Not on file     Minutes per session: Not on file     Stress: Not on file   Relationships     Social connections:     Talks on phone: Not on file     Gets together: Not on file     Attends Tenriism service: Not on file     Active member of club or organization: Not on file     Attends meetings of clubs or organizations: Not on file     Relationship status: Not on file     Intimate partner violence:     Fear of current or ex partner: Not on file      "Emotionally abused: Not on file     Physically abused: Not on file     Forced sexual activity: Not on file   Other Topics Concern     Not on file   Social History Narrative    Works as a physical therapist. Trains dogs. Crossfit exercise.       Review of Systems  General:  Patient does report some weight changes, fatigue, sleeping difficulties and loud snoring  Eyes: Negative except as noted above  Ears/Nose/Throat: Negative except as noted above  Cardiovascular: Negative except as noted above  Respiratory:  Negative except as noted above  Gastrointestinal:  Negative except as noted above  Musculoskeletal:  Negative except as noted above  Skin: Negative except as noted above  Neurologic: Negative except as noted above  Psychiatric: Negative except as noted above  Endocrine: N patient reports cold intolerance and hair loss  Heme/Lymphatic: Negative except as noted above   Allergic/Immunologic: Negative except as noted above      Objective:      Vitals:    05/02/19 0848   BP: 142/88   Pulse: (!) 105   Resp: 16   Temp: 98.7  F (37.1  C)   SpO2: 98%   Weight: 206 lb (93.4 kg)   Height: 5' 6.25\" (1.683 m)     Wt Readings from Last 3 Encounters:   05/02/19 206 lb (93.4 kg)     Body mass index is 33 kg/m . (>25?)    Physical Exam:  Constitutional: Well developed, well nourished, no acute distress.  HEENT: normocephalic/atraumatic, PERRLA/EOMI, TMs: Gray, normal light reflex, no nasal discharge.  Oral mucosa: moist; no erythema/exudate  Neck: No LAD/masses/thyromegaly/bruits  Lungs: clear bilaterally  Heart: regular rate and rhythm, no murmurs/gallops/rubs  Abdomen: Normal bowel sounds, soft, non-tender, non-distended, no masses, neg Castro's/McBurney's, no rebound/guarding  Lymphatics: no supraclavicular/axillary/epitrochlear/inguinal LAD. No edema.  Neuro: A&O x 3, CN II-XII intact  Musculoskeletal: no gross deformities.  Skin: no rashes or lesions.  Psych: Behavior appropriate, engaging.  Thought processes congruent, " non-tangential

## 2021-05-28 NOTE — TELEPHONE ENCOUNTER
"New Appointment Needed  What is the reason for the visit:    Establish Care  Physical and follow up on thyroid/A1c fatigue symptoms.  Provider Preference: Dr. Nick Ramirez  How soon do you need to be seen?: 7:00am or first available   Waitlist offered?: Yes, establish care vist with Dr. Ramirez added to waitlist until 04/25/20  Okay to leave a detailed message:  No      Patient sent New Patient Registration email to CC.  CC wrote back explaining the current scheduling situation with Dr. Ramirez.  Patient replied to email writing...    \"Would he reconsider under the circumstances. I was very pleased with his skills.\"  "

## 2021-05-28 NOTE — TELEPHONE ENCOUNTER
You are set as this patients PCP. It looks like your name is on some hoffice visits. He would still need to establish care again right?  Samia Paige CMA ............... 2:47 PM, 04/25/19

## 2021-05-28 NOTE — TELEPHONE ENCOUNTER
Patient Returning Call  Reason for call:  Returning phone call  Information relayed to patient:  Relayed message to patient. Patient made a new patient appointment with Natalie Hale at [facility]. Patient may look to schedule office visits and establish care with Dr. Nick Ramirez after the patient completes the new patient appointment  Patient has additional questions:  No  If YES, what are your questions/concerns:  n/a  Okay to leave a detailed message?: No call back needed

## 2021-05-28 NOTE — TELEPHONE ENCOUNTER
Left voicemail for patient to return call to clinic. When patient returns call, please give them below message.    Please help patient schedule next available Establish care. We will not be able to get him in sooner.  Samia Paige CMA ............... 4:09 PM, 04/25/19

## 2021-06-02 ENCOUNTER — RECORDS - HEALTHEAST (OUTPATIENT)
Dept: ADMINISTRATIVE | Facility: CLINIC | Age: 46
End: 2021-06-02

## 2021-06-03 VITALS — HEIGHT: 66 IN | WEIGHT: 206 LBS | BODY MASS INDEX: 33.11 KG/M2

## 2021-06-04 VITALS
SYSTOLIC BLOOD PRESSURE: 130 MMHG | BODY MASS INDEX: 33.59 KG/M2 | DIASTOLIC BLOOD PRESSURE: 82 MMHG | RESPIRATION RATE: 24 BRPM | HEIGHT: 66 IN | WEIGHT: 209 LBS | TEMPERATURE: 98.1 F | OXYGEN SATURATION: 96 % | HEART RATE: 72 BPM

## 2021-06-04 VITALS — WEIGHT: 210 LBS | HEIGHT: 66 IN | BODY MASS INDEX: 33.75 KG/M2

## 2021-06-05 VITALS
BODY MASS INDEX: 36.96 KG/M2 | OXYGEN SATURATION: 96 % | HEIGHT: 66 IN | SYSTOLIC BLOOD PRESSURE: 132 MMHG | WEIGHT: 230 LBS | HEART RATE: 78 BPM | TEMPERATURE: 97.8 F | RESPIRATION RATE: 24 BRPM | DIASTOLIC BLOOD PRESSURE: 78 MMHG

## 2021-06-05 NOTE — TELEPHONE ENCOUNTER
This is more of am update on recent lab test results attached to the message.    1. The TSH: 2.560 ( attached results 1/31/20)       Last TSH: 7/25/19 was 1.50. Patient states that he feels best when his TSH is closer to 1.    2. Testosterone : 210      Glucose: 115       Last Glucose: 114 (7/25/19)    Patient is inquiring if these results would be the cause of some of his muscle cramping soreness and fatigue?    3. Albumin: 5.1      Last Albumin 4.4 (7/25/19)    After reviewing the attached results, are there concerns that he should be aware of?

## 2021-06-05 NOTE — PROGRESS NOTES
Internal Medicine Office Visit  [facility]  Patient Name: Robert Garduno  Patient Age: 44 y.o.  YOB: 1975  MRN: [id number]    Date of Visit: 2020  Reason for Office Visit:   Chief Complaint   Patient presents with     Fatigue     Ongoing issue. Has recently been having more muscle pain with more activity. hormone imbalance.            Assessment / Plan / Medical Decision Makin. Fatigue, unspecified type  - Vitamin D, Total (25-Hydroxy)    2. Muscle ache of extremity  - C-Reactive Protein(CRP)  - Sedimentation Rate  - Magnesium  - Vitamin B12  - Comprehensive Metabolic Panel  - HM1(CBC and Differential)  - HM1 (CBC with Diff)    3. Hypothyroidism, unspecified type  - levothyroxine (SYNTHROID, LEVOTHROID) 175 MCG tablet; Take 1 tablet (175 mcg total) by mouth Daily at 6:00 am.  Dispense: 90 tablet; Refill: 1    4. Elevated fasting blood sugar  - Glycosylated Hemoglobin A1c    5. Vitamin D deficiency  - Vitamin D, Total (25-Hydroxy)    6. Low testosterone in male  - Ambulatory referral to Urology      Orders Placed This Encounter   Procedures     C-Reactive Protein(CRP)     Sedimentation Rate     Magnesium     Vitamin B12     Comprehensive Metabolic Panel     Glycosylated Hemoglobin A1c     Vitamin D, Total (25-Hydroxy)     HM1 (CBC with Diff)     Ambulatory referral to Urology   Followup: Return in about 4 weeks (around 3/6/2020). earlier if needed.    Health Maintenance Review  Health Maintenance   Topic Date Due     ADVANCE CARE PLANNING  2021 (Originally 9/10/1994)     PREVENTIVE CARE VISIT  2020     LIPID  2024     TD 18+ HE  2029     INFLUENZA VACCINE RULE BASED  Completed     TDAP ADULT ONE TIME DOSE  Completed     HIV SCREENING  Discontinued         I have changed Robert Garduno's levothyroxine. I am also having him maintain his biotin and zinc.      HPI:  Robert Garduno is a 44 y.o. year old who presents to the office  today with chronic conditions including history of Hashimoto's.  He did have his TSH checked it was 2.50 patient does feel better when it is slightly lower.    Patient reports fatigue.  He has history of low testosterone and has not followed up with specialty clinic. He reports muscle aches though does work out regularly.  He feels the muscle aches came on during easy training. He reports no loss of strength.   He reports muscle fatigue with repetitive movement when working out.  Patient also noted to have an elevated fasting glucose with recent testing he had completed at [facility].      Review of Systems- pertinent positive in bold:  Constitutional: Fever, chills, night sweats, fainting, weight change, fatigue, dizziness, sleeping difficulties, loud snoring/pauses in breathing  Eyes: change in vision, blurred or double vision, redness/eye pain  Ears, nose, mouth, throat: change in hearing, ear pain, hoarseness, difficulty swallowing, sores in the mouth or throat  Respiratory: shortness of breath, cough, bloody sputum, wheezing  Cardiovascular: chest pain, palpitations   Gastrointestinal: abdominal pain, heartburn/indigestion, nausea/vomiting, change in appetite, change in bowel habits, constipation or diarrhea, rectal bleeding/dark stools, difficulty swallowing  Urinary: painful urination, frequent urination, urinary urgency/incontinence, blood in urine/dark urine, nocturia (new or increasing), muscle cramps, swelling of hands, feet, ankles, leg pain/redness  Skin: change in moles/freckles, rash, nodules  Hematologic/lymphatic: swollen lymph glands, abnormal bruising/bleeding  Endocrine: excessive thirst/urination, cold or heat intolerance  Neurologic/emotional: worrisome memory change, numbness/tingling, anxiety, mood swings      Current Scheduled Meds:  Outpatient Encounter Medications as of 2/7/2020   Medication Sig Dispense Refill     levothyroxine (SYNTHROID, LEVOTHROID) 175 MCG tablet Take 1 tablet (175 mcg  "total) by mouth Daily at 6:00 am. 90 tablet 1     zinc 50 mg Tab Take by mouth.       [DISCONTINUED] levothyroxine (SYNTHROID) 150 MCG tablet Take 1 tablet (150 mcg total) by mouth daily. 30 tablet 11     biotin 10,000 mcg cap Take by mouth.       No facility-administered encounter medications on file as of 2/7/2020.      No past medical history on file.  No past surgical history on file.  Social History     Tobacco Use     Smoking status: Never Smoker     Smokeless tobacco: Never Used   Substance Use Topics     Alcohol use: Yes     Comment: rare     Drug use: Never     Family History   Problem Relation Age of Onset     Alcohol abuse Mother      Hypothyroidism Mother      Hypothyroidism Father      Hypertension Father      Hypothyroidism Sister      Multiple sclerosis Brother      Social History     Social History Narrative    Works as a physical therapist. Trains dogs. Crossfit exercise.       Objective / Physical Examination:  Vitals:    02/07/20 1150   BP: 130/82   Pulse: 72   Resp: 24   Temp: 98.1  F (36.7  C)   SpO2: 96%   Weight: 209 lb (94.8 kg)   Height: 5' 6.25\" (1.683 m)     Wt Readings from Last 3 Encounters:   02/07/20 209 lb (94.8 kg)   05/02/19 206 lb (93.4 kg)     Body mass index is 33.48 kg/m .     General Appearance: Alert and oriented, cooperative, affect appropriate, speech clear, in no apparent distress  Head: Normocephalic, atraumatic  Eyes:   Conjunctivae clear and sclerae non-icteric  Throat: Lips and mucosa moist.   Lungs: Normal inspiratory and expiratory effort  Extremities: Pulses 2+ and equal throughout. No edema.  Integumentary: Warm and dry.   Neuro: Alert and oriented, follows commands appropriately.     No results found.  Recent Results (from the past 240 hour(s))   C-Reactive Protein(CRP)   Result Value Ref Range    CRP 0.2 0.0 - 0.8 mg/dL   Sedimentation Rate   Result Value Ref Range    Sed Rate 5 0 - 15 mm/hr   Magnesium   Result Value Ref Range    Magnesium 2.1 1.8 - 2.6 mg/dL "   Vitamin B12   Result Value Ref Range    Vitamin B-12 840 (H) 213 - 816 pg/mL   Comprehensive Metabolic Panel   Result Value Ref Range    Sodium 139 136 - 145 mmol/L    Potassium 4.6 3.5 - 5.0 mmol/L    Chloride 102 98 - 107 mmol/L    CO2 29 22 - 31 mmol/L    Anion Gap, Calculation 8 5 - 18 mmol/L    Glucose 97 70 - 125 mg/dL    BUN 17 8 - 22 mg/dL    Creatinine 0.85 0.70 - 1.30 mg/dL    GFR MDRD Af Amer >60 >60 mL/min/1.73m2    GFR MDRD Non Af Amer >60 >60 mL/min/1.73m2    Bilirubin, Total 0.4 0.0 - 1.0 mg/dL    Calcium 9.7 8.5 - 10.5 mg/dL    Protein, Total 7.4 6.0 - 8.0 g/dL    Albumin 4.4 3.5 - 5.0 g/dL    Alkaline Phosphatase 59 45 - 120 U/L    AST 28 0 - 40 U/L    ALT 50 (H) 0 - 45 U/L   Glycosylated Hemoglobin A1c   Result Value Ref Range    Hemoglobin A1c 5.2 3.5 - 6.0 %   Vitamin D, Total (25-Hydroxy)   Result Value Ref Range    Vitamin D, Total (25-Hydroxy) 23.8 (L) 30.0 - 80.0 ng/mL   HM1 (CBC with Diff)   Result Value Ref Range    WBC 6.7 4.0 - 11.0 thou/uL    RBC 5.74 4.40 - 6.20 mill/uL    Hemoglobin 15.8 14.0 - 18.0 g/dL    Hematocrit 47.9 40.0 - 54.0 %    MCV 83 80 - 100 fL    MCH 27.5 27.0 - 34.0 pg    MCHC 33.0 32.0 - 36.0 g/dL    RDW 13.1 11.0 - 14.5 %    Platelets 280 140 - 440 thou/uL    MPV 10.3 8.5 - 12.5 fL    Neutrophils % 59 50 - 70 %    Lymphocytes % 30 20 - 40 %    Monocytes % 9 2 - 10 %    Eosinophils % 2 0 - 6 %    Basophils % 1 0 - 2 %    Neutrophils Absolute 3.9 2.0 - 7.7 thou/uL    Lymphocytes Absolute 2.0 0.8 - 4.4 thou/uL    Monocytes Absolute 0.6 0.0 - 0.9 thou/uL    Eosinophils Absolute 0.1 0.0 - 0.4 thou/uL    Basophils Absolute 0.0 0.0 - 0.2 thou/uL           Natalie Hale, CNP

## 2021-06-05 NOTE — TELEPHONE ENCOUNTER
Called and spoke with patient.  He states that he has been experience chronic pain: cramping, constant dull achy pain with intermittent twitching and jolts of pain to all over his body.  He reports fatigue ( mind and body) even with 8 hours sleep.  These symptoms have been on-going and the causes have yet to be identified.  Patient denies any other symptoms or concerns at this time.    Patient has scheduled a follow-up appointment to discuss labs and determine plan of care.    Patient was advised to seek medical assistance if the symptoms worsen before the scheduled appointment.    Patient verbalized understanding and is agreeable with the plan of care.

## 2021-06-08 NOTE — PROGRESS NOTES
"Robert Garduno is a 44 y.o. male who is being evaluated via a billable video visit.      The patient has been notified of following:     \"This video visit will be conducted via a call between you and your physician/provider. We have found that certain health care needs can be provided without the need for an in-person physical exam.  This service lets us provide the care you need with a video conversation.  If a prescription is necessary we can send it directly to your pharmacy.  If lab work is needed we can place an order for that and you can then stop by our lab to have the test done at a later time.    Video visits are billed at different rates depending on your insurance coverage. Please reach out to your insurance provider with any questions.    If during the course of the call the physician/provider feels a video visit is not appropriate, you will not be charged for this service.\"    Patient has given verbal consent to a Video visit? Yes    Will anyone else be joining your video visit? No    Video-Visit Details    Type of service:  Video Visit    Originating Location (pt. Location): Home    Distant Location (provider location):  [facility]     Platform used for Video Visit: proteonomix    Dear Dr. Hale, Natalie Pérez, Cnp  [address] [phone]    Thank you for the opportunity to participate in the care of Mr. Robert Garduno.    Assessment and Plan:    In summary Robert Garduno is a 44 y.o. year old male here for sleep disturbance.  1. Witness apneic spells/Hypersomnia/Snoring/Sleep related headaches   Mr. Robert Garduno has high risk for obstructive sleep apnea based on the history of witness apnea, hypersomnia, snoring, sleep related headaches and a crowded airway. I educated the patient on the underlying pathophysiology of obstructive sleep apnea. We reviewed the risks associated with sleep apnea, including increased cardiovascular risk and overall death. We " talked about treatments briefly. I recommend getting a Home sleep study. The patient should return to the clinic to discuss results and treatment option in a patient-centered approach.    History of present illness:    He is a 44 y.o. male who comes to the clinic with a chief complaint of excessive daytime sleepiness that has been going on for at least 10 years. He has been told by loved ones that he has a pauses in his breathing during sleep followed by loud snoring. He also complains of waking up with headaches and palpitation/anxiety. The patient's review of systems is significant for Hashimoto's thyroiditis which may contribute to his sense of sleepiness.     Ideal Sleep-Wake Cycle(devoid of societal pressure):    Patient would try to initiate sleep at around 9PM with a sleep latency of less than 10 minutes. The patient would have 2 awakenings. Final wake up time is around 7AM. He would get drowsy at around 11AM and 2PM. If allowed to nap, he would nap for an hour.    Patient told to return in one week after the sleep study is interpreted.    Past Medical History  Past Medical History:   Diagnosis Date     Hashimoto's thyroiditis         Past Surgical History  Past Surgical History:   Procedure Laterality Date     HAND SURGERY       LIPOMA RESECTION          Meds  Current Outpatient Medications   Medication Sig Dispense Refill     levothyroxine (SYNTHROID, LEVOTHROID) 175 MCG tablet Take 1 tablet (175 mcg total) by mouth Daily at 6:00 am. 90 tablet 1     zinc 50 mg Tab Take by mouth.       No current facility-administered medications for this visit.         Allergies  Patient has no known allergies.     Social History  Social History     Socioeconomic History     Marital status:      Spouse name: Not on file     Number of children: Not on file     Years of education: Not on file     Highest education level: Not on file   Occupational History     Not on file   Social Needs     Financial resource strain:  Not on file     Food insecurity     Worry: Not on file     Inability: Not on file     Transportation needs     Medical: Not on file     Non-medical: Not on file   Tobacco Use     Smoking status: Never Smoker     Smokeless tobacco: Never Used   Substance and Sexual Activity     Alcohol use: Yes     Comment: rare     Drug use: Never     Sexual activity: Not Currently   Lifestyle     Physical activity     Days per week: Not on file     Minutes per session: Not on file     Stress: Not on file   Relationships     Social connections     Talks on phone: Not on file     Gets together: Not on file     Attends Muslim service: Not on file     Active member of club or organization: Not on file     Attends meetings of clubs or organizations: Not on file     Relationship status: Not on file     Intimate partner violence     Fear of current or ex partner: Not on file     Emotionally abused: Not on file     Physically abused: Not on file     Forced sexual activity: Not on file   Other Topics Concern     Not on file   Social History Narrative    Works as a physical therapist. Trains dogs. Crossfit exercise.        Family History  Family History   Problem Relation Age of Onset     Alcohol abuse Mother      Hypothyroidism Mother      Hypothyroidism Father      Hypertension Father      Sleep apnea Father      Hypothyroidism Sister      Multiple sclerosis Brother      Review of Systems:  Constitutional: Negative except as noted in HPI.   Eyes: Negative except as noted in HPI.   ENT: Negative except as noted in HPI.   Cardiovascular: Negative except as noted in HPI.   Respiratory: Negative except as noted in HPI.   Gastrointestinal: Negative except as noted in HPI.   Genitourinary: Negative except as noted in HPI.   Musculoskeletal: Negative except as noted in HPI.   Integumentary: Negative except as noted in HPI.   Neurological: Negative except as noted in HPI.   Psychiatric: Negative except as noted in HPI.   Endocrine: Negative  "except as noted in HPI.   Hematologic/Lymphatic: Negative except as noted in HPI.      STOP BANG 6/15/2020   Do you snore loudly (louder than talking or loud enough to be heard through closed doors)? 1   Do you often feel tired, fatigued, or sleepy during daytime? 1   Has anyone observed you stop breathing in your sleep? 1   Do you have or are you being treated for high blood pressure? 1   BMI more than 35 kg/m2 0   Age over 50 years old? 0   Gender male? 1     Epworths Sleepiness Scale 6/15/2020   Sitting and reading 2   Watching TV 3   Sitting, inactive in a public place (e.g. a theatre or a meeting) 1   As a passenger in a car for an hour without a break 0   Lying down to rest in the afternoon when circumstances permit 3   Sitting and talking to someone 0   Sitting quietly after a lunch without alcohol 1   In a car, while stopped for a few minutes in traffic 0   Total score 10     No flowsheet data found.    Physical Exam:  Ht 5' 6.25\" (1.683 m)   Wt 210 lb (95.3 kg)   BMI 33.64 kg/m    BMI:Body mass index is 33.64 kg/m .   GEN: NAD,   Head: Normocephalic.  EYES: EOMI  ENT: Oropharynx is clear, Porras class 4+ airway.   Neurological: Alert, oriented to time, place, and person.  Psych: normal mood, normal affect     Labs/Studies:     Lab Results   Component Value Date    WBC 6.7 02/07/2020    HGB 15.8 02/07/2020    HCT 47.9 02/07/2020    MCV 83 02/07/2020     02/07/2020         Chemistry        Component Value Date/Time     02/07/2020 1234    K 4.6 02/07/2020 1234     02/07/2020 1234    CO2 29 02/07/2020 1234    BUN 17 02/07/2020 1234    CREATININE 0.85 02/07/2020 1234    GLU 97 02/07/2020 1234        Component Value Date/Time    CALCIUM 9.7 02/07/2020 1234    ALKPHOS 59 02/07/2020 1234    AST 28 02/07/2020 1234    ALT 50 (H) 02/07/2020 1234    BILITOT 0.4 02/07/2020 1234            No results found for: FERRITIN  Lab Results   Component Value Date    TSH 1.50 07/25/2019         Patient " verbalized understanding of these issues, agrees with the plan and all questions were answered today. Patient was given an opportuntity to voice any other symptoms or concerns not listed above. Patient did not have any other symptoms or concerns.         Luis Hills DO  Board Certified in Internal Medicine and Sleep Medicine    I spent a total of 24 minutes of face-to-face encounter and more than 50% of the encounter was used for counseling or coordination of care.    Audio and visual devices were used for this virtual clinic visit with permission from patient.

## 2021-06-08 NOTE — PATIENT INSTRUCTIONS - HE
What is a Home Sleep Study?    your doctor can give you a portable sleep monitor to use at home, so you don t have to spend the night in the sleep lab. But you should use a portable monitor only if:   ?Your doctor thinks you have a condition that makes you stop breathing for short periods while you are asleep, called  sleep apnea.    ?You do not have other serious medical problems, such as heart disease or lung disease.    Please bring the home sleep study device back to the sleep center as soon as you are finished with it so we can score it.     The cost of care estimate line is [phone]. They are able to give the patient an estimate of the charges and also an estimate of their insurance coverage/patient responsibility.   After your sleep study is performed, please call us at [phone] to schedule for a follow up to review the results of the sleep study.    Consider using one tab of low dose melatonin 3 mg or less on the night of the study.    It is completely voluntary.    Do not drive or operate machinery after intake of melatonin.

## 2021-06-10 NOTE — TELEPHONE ENCOUNTER
Refill Request  Did you contact pharmacy: Yes  Medication name:   Requested Prescriptions     Pending Prescriptions Disp Refills     levothyroxine (SYNTHROID, LEVOTHROID) 175 MCG tablet 90 tablet 1     Sig: Take 1 tablet (175 mcg total) by mouth Daily at 6:00 am.     Who prescribed the medication: Natalie Hale CNP  Requested Pharmacy: [facility]  Is patient out of medication: N/A  Patient notified refills processed in 3 business days:  N/A  Okay to leave a detailed message: yes

## 2021-06-10 NOTE — TELEPHONE ENCOUNTER
RN cannot approve Refill Request    RN can NOT refill this medication PCP messaged that patient is overdue for Labs and Protocol failed and NO refill given. Last office visit: 2/7/2020 Natalie Hale CNP Last Physical: 5/2/2019 Last MTM visit: Visit date not found Last visit same specialty: 2/7/2020 Natalie Hale CNP.  Next visit within 3 mo: Visit date not found  Next physical within 3 mo: Visit date not found      Kat Mota, [facility] Triage/Med Refill 8/21/2020    Requested Prescriptions   Pending Prescriptions Disp Refills     levothyroxine (SYNTHROID, LEVOTHROID) 175 MCG tablet 90 tablet 1     Sig: Take 1 tablet (175 mcg total) by mouth Daily at 6:00 am.       Thyroid Hormones Protocol Failed - 8/19/2020 11:32 AM        Failed - TSH on file in past 12 months for patient age 12 & older     TSH   Date Value Ref Range Status   07/25/2019 1.50 0.30 - 5.00 uIU/mL Final                   Passed - Provider visit in past 12 months or next 3 months     Last office visit with prescriber/PCP: 2/7/2020 Natalie Hale CNP OR same dept: 2/7/2020 Natalie Hale CNP OR same specialty: 2/7/2020 Natalie Hale CNP  Last physical: 5/2/2019 Last MTM visit: Visit date not found   Next visit within 3 mo: Visit date not found  Next physical within 3 mo: Visit date not found  Prescriber OR PCP: Natalie Hale CNP  Last diagnosis associated with med order: 1. Hypothyroidism, unspecified type  - levothyroxine (SYNTHROID, LEVOTHROID) 175 MCG tablet; Take 1 tablet (175 mcg total) by mouth Daily at 6:00 am.  Dispense: 90 tablet; Refill: 1    If protocol passes may refill for 12 months if within 3 months of last provider visit (or a total of 15 months).

## 2021-06-10 NOTE — PROGRESS NOTES
Patient was offered choice of vendor and chose [facility].  Patient Robert Garduno was set up at [facility] on July 22, 2020. Patient received a Resmed Airsense 10 Auto CPAP. Pressures were set at 5-20cm H2O.   Patient s ramp is 4 cm H2O for AUTO and FLEX/EPR is EPR 2 .  Patient received a Respironics Mask name: Dreamwear nasal mask Size fitpack, haeted tubing and heated humidifier.    Grisel Goff

## 2021-06-11 NOTE — PROGRESS NOTES
"Robert Garduno is a 45 y.o. male who is being evaluated via a billable video visit.      The patient has been notified of following:     \"This video visit will be conducted via a call between you and your physician/provider. We have found that certain health care needs can be provided without the need for an in-person physical exam.  This service lets us provide the care you need with a video conversation.  If a prescription is necessary we can send it directly to your pharmacy.  If lab work is needed we can place an order for that and you can then stop by our lab to have the test done at a later time.    Video visits are billed at different rates depending on your insurance coverage. Please reach out to your insurance provider with any questions.    If during the course of the call the physician/provider feels a video visit is not appropriate, you will not be charged for this service.\"    Patient has given verbal consent to a Video visit? Yes  How would you like to obtain your AVS? AVS Preference: MyChart.  If dropped by the video visit, the video invitation should be sent to:   Will anyone else be joining your video visit? No        Video Start Time: 6:57am     Additional provider notes:     Internal Medicine Tele Visit  [facility]  Patient Name: Robert Garduno  Patient Age: 45 y.o.  YOB: 1975  MRN: [id number]    Date of Visit: 2020  Reason for Tele Visit:   Chief Complaint   Patient presents with     Follow-up     Would like to discuss getting a calcium test. Patient is seeing ortho and wants to rule out cellulitis. Issues with the foot in the past with same foot.            Assessment / Plan / Medical Decision Makin. Hypothyroidism, unspecified type  - levothyroxine (SYNTHROID, LEVOTHROID) 175 MCG tablet; Take 1 tablet (175 mcg total) by mouth Daily at 6:00 am.  Dispense: 90 tablet; Refill: 3  - Thyroid Stimulating Hormone (TSH); Future    2. " Vitamin D deficiency  - Vitamin D, Total (25-Hydroxy); Future    3. Elevated LFTs  - Comprehensive Metabolic Panel; Future    4. Hypercholesteremia  - Lipid [place] FASTING; Future    5. Serum calcium elevated  - Comprehensive Metabolic Panel; Future  - Parathyroid Hormone Intact; Future    6. Sleep apnea, unspecified type  Recommend continued use of CPAP as prescribed     7. Elevated fasting blood sugar  - Glycosylated Hemoglobin A1c; Future    Orders Placed This Encounter   Procedures     Lipid [place] FASTING     Comprehensive Metabolic Panel     Thyroid Stimulating Hormone (TSH)     Parathyroid Hormone Intact     Vitamin D, Total (25-Hydroxy)     Glycosylated Hemoglobin A1c   Followup: Return in about 6 months (around 3/24/2021). earlier if needed.    Health Maintenance Review  Health Maintenance   Topic Date Due     PREVENTIVE CARE VISIT  05/02/2020     INFLUENZA VACCINE RULE BASED (1) 08/01/2020     ADVANCE CARE PLANNING  02/05/2021 (Originally 9/10/1994)     LIPID  05/02/2024     TD 18+ HE  01/26/2029     TDAP ADULT ONE TIME DOSE  Completed     HEPATITIS B VACCINES  Aged Out     HIV SCREENING  Discontinued         I am having Rboert Garduno maintain his zinc and levothyroxine.      HPI:  Robert Garduno is a 45 y.o. year old who presents to the virtual office visit with chronic conditions including hashimoto's.  Patient reports he feels well on the current dose of Levothyroxine and states he continues to take it daily as prescribed .      Work injury March 2020 patient ran over foot with wheelchair.  Pain in the 1st metatarsal and had imaging which indicated some edema. He had completed PT, desensitiation and did note some improvement though not relieved.  He is going in to see Orthopedics to determine status.  He states this effective his life and he is unable to complete workouts. He has history of elevated calcium.  He is requesting lab work to be completed     Patient was referred to sleep medicine in  2019 and was dx with Severe Sleep apnea and is using cpap nightly and has found his life has significanlty improvement.      Review of Systems- pertinent positive in bold:  Constitutional: Fever, chills, night sweats, fainting, weight change, fatigue, dizziness, sleeping difficulties, loud snoring/pauses in breathing  Eyes: change in vision, blurred or double vision, redness/eye pain  Ears, nose, mouth, throat: change in hearing, ear pain, hoarseness, difficulty swallowing, sores in the mouth or throat  Respiratory: shortness of breath, cough, bloody sputum, wheezing  Cardiovascular: chest pain, palpitations   Gastrointestinal: abdominal pain, heartburn/indigestion, nausea/vomiting, change in appetite, change in bowel habits, constipation or diarrhea, rectal bleeding/dark stools, difficulty swallowing  Urinary: painful urination, frequent urination, urinary urgency/incontinence, blood in urine/dark urine, nocturia (new or increasing), muscle cramps, swelling of hands, feet, ankles, leg pain/redness  Skin: change in moles/freckles, rash, nodules  Hematologic/lymphatic: swollen lymph glands, abnormal bruising/bleeding  Endocrine: excessive thirst/urination, cold or heat intolerance  Neurologic/emotional: worrisome memory change, numbness/tingling, anxiety, mood swings      Current Scheduled Meds:  Outpatient Encounter Medications as of 9/24/2020   Medication Sig Dispense Refill     levothyroxine (SYNTHROID, LEVOTHROID) 175 MCG tablet Take 1 tablet (175 mcg total) by mouth Daily at 6:00 am. 90 tablet 3     zinc 50 mg Tab Take by mouth.       [DISCONTINUED] levothyroxine (SYNTHROID, LEVOTHROID) 175 MCG tablet Take 1 tablet (175 mcg total) by mouth Daily at 6:00 am. 30 tablet 0     No facility-administered encounter medications on file as of 9/24/2020.      Past Medical History:   Diagnosis Date     Hashimoto's thyroiditis      Past Surgical History:   Procedure Laterality Date     HAND SURGERY       LIPOMA RESECTION        Social History     Tobacco Use     Smoking status: Never Smoker     Smokeless tobacco: Never Used   Substance Use Topics     Alcohol use: Yes     Comment: rare     Drug use: Never     Family History   Problem Relation Age of Onset     Alcohol abuse Mother      Hypothyroidism Mother      Hypothyroidism Father      Hypertension Father      Sleep apnea Father      Hypothyroidism Sister      Multiple sclerosis Brother      Social History     Social History Narrative    Works as a physical therapist. Trains dogs. Crossfit exercise.       Objective / Physical Examination:  There were no vitals filed for this visit.  Wt Readings from Last 3 Encounters:   06/17/20 210 lb (95.3 kg)   02/07/20 209 lb (94.8 kg)   05/02/19 206 lb (93.4 kg)     There is no height or weight on file to calculate BMI.     GENERAL: Healthy, alert and no distress  EYES: Eyes grossly normal to inspection. No discharge or erythema, or obvious scleral/conjunctival abnormalities.  RESP: No audible wheeze, cough, or visible cyanosis.  No visible retractions or increased work of breathing.    NEURO: Cranial nerves grossly intact. Mentation and speech appropriate for age.  PSYCH: Mentation appears normal, affect normal/bright, judgement and insight intact, normal speech and appearance well-groomed      No results found.  No results found for this or any previous visit (from the past 240 hour(s)).        Natalie Hale CNP      Video-Visit Details    Type of service:  Video Visit    Video End Time (time video stopped): 7:06 AM  Originating Location (pt. Location): Home    Distant Location (provider location):  [facility]     Platform used for Video Visit: Julia Hale CNP

## 2021-06-11 NOTE — TELEPHONE ENCOUNTER
RN cannot approve Refill Request    RN can NOT refill this medication PCP messaged that patient is overdue for Labs. Last office visit: Visit date not found Last Physical: Visit date not found Last MTM visit: Visit date not found Last visit same specialty: 2/7/2020 Natalie Hale CNP.  Next visit within 3 mo: Visit date not found  Next physical within 3 mo: Visit date not found      Fang Craft, [facility] Triage/Med Refill 9/13/2020    Requested Prescriptions   Pending Prescriptions Disp Refills     levothyroxine (SYNTHROID, LEVOTHROID) 175 MCG tablet 30 tablet 0     Sig: Take 1 tablet (175 mcg total) by mouth Daily at 6:00 am.       Thyroid Hormones Protocol Failed - 9/13/2020  8:08 AM        Failed - TSH on file in past 12 months for patient age 12 & older     TSH   Date Value Ref Range Status   07/25/2019 1.50 0.30 - 5.00 uIU/mL Final                   Passed - Provider visit in past 12 months or next 3 months     Last office visit with prescriber/PCP: Visit date not found OR same dept: 2/7/2020 Natalie Hale CNP OR same specialty: 2/7/2020 Natalie aHle CNP  Last physical: Visit date not found Last MTM visit: Visit date not found   Next visit within 3 mo: Visit date not found  Next physical within 3 mo: Visit date not found  Prescriber OR PCP: Generic Provider Dandre  Last diagnosis associated with med order: 1. Hypothyroidism, unspecified type  - levothyroxine (SYNTHROID, LEVOTHROID) 175 MCG tablet; Take 1 tablet (175 mcg total) by mouth Daily at 6:00 am.  Dispense: 30 tablet; Refill: 0    If protocol passes may refill for 12 months if within 3 months of last provider visit (or a total of 15 months).

## 2021-06-14 NOTE — TELEPHONE ENCOUNTER
Refill Approved    Rx renewed per Medication Renewal Policy. Medication was last renewed on 9/29/2020.    Karen Drake, [facility] Triage/Med Refill 1/26/2021     Requested Prescriptions   Pending Prescriptions Disp Refills     levothyroxine (SYNTHROID, LEVOTHROID) 200 MCG tablet 90 tablet 0     Sig: Take 1 tablet (200 mcg total) by mouth Daily at 6:00 am.       Thyroid Hormones Protocol Passed - 1/25/2021  1:58 PM        Passed - Provider visit in past 12 months or next 3 months     Last office visit with prescriber/PCP: Visit date not found OR same dept: 2/7/2020 Natalie Hale CNP OR same specialty: 2/7/2020 Natalie Hale CNP  Last physical: Visit date not found Last MTM visit: Visit date not found   Next visit within 3 mo: Visit date not found  Next physical within 3 mo: Visit date not found  Prescriber OR PCP: Generic Provider Michelinehart  Last diagnosis associated with med order: 1. Hypothyroidism, unspecified type  - levothyroxine (SYNTHROID, LEVOTHROID) 200 MCG tablet; Take 1 tablet (200 mcg total) by mouth Daily at 6:00 am.  Dispense: 90 tablet; Refill: 0    If protocol passes may refill for 12 months if within 3 months of last provider visit (or a total of 15 months).             Passed - TSH on file in past 12 months for patient age 12 & older     TSH   Date Value Ref Range Status   12/09/2020 0.73 0.30 - 5.00 uIU/mL Final

## 2021-06-17 NOTE — TELEPHONE ENCOUNTER
Telephone Encounter by Flower Avila CMA at 12/3/2020 10:51 AM     Author: Flower Avila CMA Service: -- Author Type: Certified Medical Assistant    Filed: 12/3/2020 10:52 AM Encounter Date: 11/21/2020 Status: Signed    : Flower Avila CMA (Certified Medical Assistant)       Natalie Hale, Natalie Ortiz Care Team [place] 5 hours ago (5:43 AM)     Please advise patient as to what labs of his request have already been drawn. He will need to schedule another lab visit for additional lab draw requests    Message text      Please clarify  - pt has not had labs since 09/28/2020    Would you like appt with pt for orders?

## 2021-06-17 NOTE — TELEPHONE ENCOUNTER
Telephone Encounter by Flower Avila CMA at 3/17/2021  9:17 AM     Author: Flower Avila CMA Service: -- Author Type: Certified Medical Assistant    Filed: 3/17/2021  9:19 AM Encounter Date: 3/11/2021 Status: Signed    : Flower Avila CMA (Certified Medical Assistant)       Natalie Hale CNP [place], [facility] 16 hours ago (4:31 PM)     Please schedule patient for an appointment    Message text       Jasmine Nunez CMA [facility], [facility], CNP 22 hours ago (10:31 AM)     Please review and advise.   Thank you so much!   Jasmine Nunez CMA        Message text

## 2021-06-17 NOTE — TELEPHONE ENCOUNTER
Telephone Encounter by Flower Avila CMA at 3/11/2021  3:52 PM     Author: Flower Avila CMA Service: -- Author Type: Certified Medical Assistant    Filed: 3/11/2021  3:53 PM Encounter Date: 3/11/2021 Status: Signed    : Flower Avila CMA (Certified Medical Assistant)       Natalie Hale, [facility] [place] 26 minutes ago (3:24 PM)     Per Dr. Alejandrina collins messages and multiple concerns are to be scheduled for an appointment     Message text         Pt is asking for a referral - do you still want appointment?

## 2021-06-21 NOTE — LETTER
Letter by Natalie Hale CNP at      Author: Natalie Hale CNP Service: -- Author Type: --    Filed:  Encounter Date: 3/18/2021 Status: (Other)         Dr. Velvet Fallon  [address] [facility] [address]  [place] [id number]        March 18, 2021       Patient: Robert Garduno   MR Number: [id number]   YOB: 1975   Date of Visit: 3/18/2021       Dear Dr. Velvet Fallon:    I am referring my patient, Robert Garduno, to you for evaluation of multiple joint pain with concern for seronegative inflammatory conditions.     I appreciate your assistance in his care and look forward to your findings and recommendations.    Sincerely,                           Electronically signed by Natalie Hale CNP        CC  No Recipients

## 2021-06-30 NOTE — PROGRESS NOTES
Progress Notes by Natalie Hale CNP at 3/18/2021  2:40 PM     Author: Natalie Hale CNP Service: -- Author Type: Nurse Practitioner    Filed: 3/19/2021  7:38 AM Encounter Date: 3/18/2021 Status: Signed    : Natalie Hale CNP (Nurse Practitioner)            [facility]  Patient Name: Robert Garduno  Patient Age: 45 y.o.  YOB: 1975  MRN: [id number]    Date of Visit: 3/18/2021  Reason for Office Visit:   Chief Complaint   Patient presents with   ? Follow-up     Muscle pain. Was instructed by PCP to come in.            Assessment / Plan / Medical Decision Makin. Hypothyroidism, unspecified type  - Thyroid Cascade to be slightly low we will decrease her levothyroxine from 200 mcg to 175 mcg daily on an empty stomach and recheck TSH in 8 to 12 weeks make medication adjustments accordingly.  - T4, Free  - levothyroxine (SYNTHROID, LEVOTHROID) 175 MCG tablet; Take 1 tablet (175 mcg total) by mouth Daily at 6:00 am.  Dispense: 90 tablet; Refill: 0  - Thyroid Cascade; Future    2. Multiple joint pain  - Uric Acid obtained today which was WNL.  Patient previously completed CRP, LION sed rate all WNL.  Patient requested to be referred to Dr. Velvet Fallon though she has a limited practice patient requested my office reach out to her office for consideration of being seen due to his concern for seronegative inflammatory conditions.      3. Scalp irritation  - ketoconazole (NIZORAL) 2 % cream; Apply topically daily.  Dispense: 15 g; Refill: 0      Orders Placed This Encounter   Procedures   ? Thyroid Cascade   ? Uric Acid   ? T4, Free   ? Thyroid Cascade   Followup: Return in about 4 weeks (around 4/15/2021). earlier if needed.            Health Maintenance Review  Health Maintenance   Topic Date Due   ? ADVANCE CARE PLANNING  Never done   ? PREVENTIVE CARE VISIT  2020   ? INFLUENZA VACCINE RULE BASED (1) 2021 (Originally 2020)   ? LIPID   "09/28/2025   ? TD 18+ HE  01/26/2029   ? TDAP ADULT ONE TIME DOSE  Completed   ? Pneumococcal Vaccine: Pediatrics (0 to 5 Years) and At-Risk Patients (6 to 64 Years)  Aged Out   ? HEPATITIS B VACCINES  Aged Out   ? HEPATITIS C SCREENING  Discontinued   ? HIV SCREENING  Discontinued         I have changed Robert Garduno's levothyroxine. I am also having him start on ketoconazole. Additionally, I am having him maintain his zinc and naltrexone HCl (NALTREXONE ORAL).      HPI:  Robert Garduno is a 45 y.o. year old who presents to the office today with chronic conditions including Hashimoto's, vitamin D deficiency, elevated LFTs, hypercholesterolemia, history of serum calcium elevated, sleep apnea, elevated fasting blood glucose.    Patient has not been seen in clinic for greater than 1 year.    Patient was seen by sleep medicine in 2019 and was prescribed a CPAP to be utilized nightly.      In February 2020 patient reported having fatigue history of low testosterone though had not been followed by specialty clinic.  He had reported muscle aches though was not working out regularly and felt that muscle aches came on during EEG training.  He had reported no loss of strength and reported that the muscle fatigue occurred with repetitive movements when working out.    He is questioning if the right foot is CRPS.  He states he sometime will have symmetrical joint pain though not particularly at the same time. He states he will have some finger pain. He is having neck pain which began a month ago and feels \"stiff with fluid in it\". He state \"it pops\".  He describes muscle pain.  He describes a diffused tendonitis.  He states he lifting weight very little has he feels this worsens his symptoms but make it feel good during the exercise but worsens a day or two later. He is concerned \"patches on my head\".  He is requesting to be seen by a Rheumatologist.      He states he may have \"heart racing\" when awakening in the morning " and then resolves when up for the day.  Patient reports he feels this may be related to his thyroid medication.    Patient reports having skin irritation on his scalp he believes it is psoriasis he states he has had it since he was very young does not currently have any prescribed medications.      Review of Systems- pertinent positive in bold:  Constitutional: Fever, chills, night sweats, fainting, weight change, fatigue, dizziness, sleeping difficulties, loud snoring/pauses in breathing  Eyes: change in vision, blurred or double vision, redness/eye pain  Ears, nose, mouth, throat: change in hearing, ear pain, hoarseness, difficulty swallowing, sores in the mouth or throat  Respiratory: shortness of breath, cough, bloody sputum, wheezing  Cardiovascular: chest pain, palpitations   Gastrointestinal: abdominal pain, heartburn/indigestion, nausea/vomiting, change in appetite, change in bowel habits, constipation or diarrhea, rectal bleeding/dark stools, difficulty swallowing  Urinary: painful urination, frequent urination, urinary urgency/incontinence, blood in urine/dark urine, nocturia (new or increasing), muscle cramps, swelling of hands, feet, ankles, leg pain/redness  Skin: See HPI  Hematologic/lymphatic: swollen lymph glands, abnormal bruising/bleeding  Endocrine: excessive thirst/urination, cold or heat intolerance  Neurologic/emotional: worrisome memory change, numbness/tingling, anxiety, mood swings      Current Scheduled Meds:  Outpatient Encounter Medications as of 3/18/2021   Medication Sig Dispense Refill   ? levothyroxine (SYNTHROID, LEVOTHROID) 175 MCG tablet Take 1 tablet (175 mcg total) by mouth Daily at 6:00 am. 90 tablet 0   ? naltrexone HCl (NALTREXONE ORAL) Take 4.5 mg by mouth.     ? [DISCONTINUED] levothyroxine (SYNTHROID, LEVOTHROID) 200 MCG tablet Take 1 tablet (200 mcg total) by mouth Daily at 6:00 am. 90 tablet 0   ? ketoconazole (NIZORAL) 2 % cream Apply topically daily. 15 g 0   ? zinc 50  "mg Tab Take by mouth.       No facility-administered encounter medications on file as of 3/18/2021.      Past Medical History:   Diagnosis Date   ? Hashimoto's thyroiditis      Past Surgical History:   Procedure Laterality Date   ? HAND SURGERY     ? LIPOMA RESECTION       Social History     Tobacco Use   ? Smoking status: Never Smoker   ? Smokeless tobacco: Never Used   Substance Use Topics   ? Alcohol use: Yes     Comment: rare   ? Drug use: Never     Family History   Problem Relation Age of Onset   ? Alcohol abuse Mother    ? Hypothyroidism Mother    ? Hypothyroidism Father    ? Hypertension Father    ? Sleep apnea Father    ? Hypothyroidism Sister    ? Multiple sclerosis Brother      Social History     Social History Narrative    Works as a physical therapist. Trains dogs. [employer] exercise.       Objective / Physical Examination:  Vitals:    03/18/21 1433   BP: 132/78   Pulse: 78   Resp: 24   Temp: 97.8  F (36.6  C)   SpO2: 96%   Weight: (!) 230 lb (104.3 kg)   Height: 5' 6.25\" (1.683 m)     Wt Readings from Last 3 Encounters:   03/18/21 (!) 230 lb (104.3 kg)   06/17/20 210 lb (95.3 kg)   02/07/20 209 lb (94.8 kg)     Body mass index is 36.84 kg/m .     General Appearance: Alert and oriented, cooperative, affect appropriate, speech clear, in no apparent distress  Head: Normocephalic, atraumatic  Eyes:  Conjunctivae clear and sclerae non-icteric  Neck: Supple, trachea midline. No cervical adenopathy  Lungs: Clear to auscultation bilaterally. No adventitious lung sounds noted. Normal inspiratory and expiratory effort  Cardiovascular: Regular rate, normal S1, S2. No murmurs, rubs, or gallops  Extremities: Pulses 2+ and equal throughout. No edema.  Full range of motion of extremities.  Patient noted to have some dorsomedial foot pain upon palpation bilateral left greater than right though it was the right foot that was injured previous  Integumentary: Warm and dry.  Small dry patches in the scalp  Neuro: Alert and " oriented, follows commands appropriately.     No results found.  Recent Results (from the past 240 hour(s))   Thyroid Cascade   Result Value Ref Range    TSH 0.25 (L) 0.30 - 5.00 uIU/mL   Uric Acid   Result Value Ref Range    Uric Acid 5.2 3.0 - 8.0 mg/dL   T4, Free   Result Value Ref Range    Free T4 1.3 0.7 - 1.8 ng/dL     Diagnostics:     Results for orders placed or performed in visit on 03/18/21   Thyroid Cascade   Result Value Ref Range    TSH 0.25 (L) 0.30 - 5.00 uIU/mL   Uric Acid   Result Value Ref Range    Uric Acid 5.2 3.0 - 8.0 mg/dL   T4, Free   Result Value Ref Range    Free T4 1.3 0.7 - 1.8 ng/dL       Quality review:     No data recorded    No data recorded      Natalie Hale, [facility]  Internal Medicine  [address] [address]

## 2021-07-07 ENCOUNTER — COMMUNICATION - HEALTHEAST (OUTPATIENT)
Dept: FAMILY MEDICINE | Facility: CLINIC | Age: 46
End: 2021-07-07

## 2021-07-07 DIAGNOSIS — E03.9 HYPOTHYROIDISM, UNSPECIFIED TYPE: ICD-10-CM

## 2021-07-07 NOTE — TELEPHONE ENCOUNTER
Telephone Encounter by Aram Swenson, RN at 7/7/2021  9:37 AM     Author: Aram Swenson, RN Service: -- Author Type: Registered Nurse    Filed: 7/7/2021  9:38 AM Encounter Date: 7/7/2021 Status: Signed    : Aram Swenson, RN (Registered Nurse)       Refill Approved    Rx renewed per Medication Renewal Policy. Medication was last renewed on 3/19/21.    Aram Swenson, [facility] Triage/Med Refill 7/7/2021     Requested Prescriptions   Pending Prescriptions Disp Refills   ? levothyroxine (SYNTHROID, LEVOTHROID) 175 MCG tablet 90 tablet 0     Sig: Take 1 tablet (175 mcg total) by mouth Daily at 6:00 am.       Thyroid Hormones Protocol Passed - 7/7/2021  7:51 AM        Passed - Provider visit in past 12 months or next 3 months     Last office visit with prescriber/PCP: 3/18/2021 Natalie Hale CNP OR same dept: Visit date not found OR same specialty: Visit date not found  Last physical: 5/2/2019 Last MTM visit: Visit date not found   Next visit within 3 mo: Visit date not found  Next physical within 3 mo: Visit date not found  Prescriber OR PCP: Natalie Hale CNP  Last diagnosis associated with med order: 1. Hypothyroidism, unspecified type  - levothyroxine (SYNTHROID, LEVOTHROID) 175 MCG tablet; Take 1 tablet (175 mcg total) by mouth Daily at 6:00 am.  Dispense: 90 tablet; Refill: 0    If protocol passes may refill for 12 months if within 3 months of last provider visit (or a total of 15 months).             Passed - TSH on file in past 12 months for patient age 12 & older     TSH   Date Value Ref Range Status   03/18/2021 0.25 (L) 0.30 - 5.00 uIU/mL Final

## 2021-07-07 NOTE — TELEPHONE ENCOUNTER
Telephone Encounter by Jasmine Nunez CMA at 7/7/2021  7:51 AM     Author: Jasmine Nunez CMA Service: -- Author Type: Certified Medical Assistant    Filed: 7/7/2021  7:51 AM Encounter Date: 7/7/2021 Status: Signed    : Jasmine Nunez CMA (Certified Medical Assistant)       Received refill request levothyroxine 175 mcg

## 2021-10-02 ENCOUNTER — HEALTH MAINTENANCE LETTER (OUTPATIENT)
Age: 46
End: 2021-10-02

## 2022-01-22 ENCOUNTER — HEALTH MAINTENANCE LETTER (OUTPATIENT)
Age: 47
End: 2022-01-22

## 2022-04-23 ENCOUNTER — MYC MEDICAL ADVICE (OUTPATIENT)
Dept: INTERNAL MEDICINE | Facility: CLINIC | Age: 47
End: 2022-04-23
Payer: COMMERCIAL

## 2022-04-23 DIAGNOSIS — E03.9 HYPOTHYROIDISM, UNSPECIFIED TYPE: Primary | ICD-10-CM

## 2022-04-28 NOTE — TELEPHONE ENCOUNTER
Pt is requesting refill request of tana'd up medication. Pt was notified that appt is needed. Pt states he will look at his schedule and get back to us.     Med is tana'd up for verification and approval, if appropriate.     Herbie Ordonez Jr., CMA on 4/28/2022 at 2:13 PM

## 2022-04-29 RX ORDER — LEVOTHYROXINE SODIUM 175 UG/1
175 TABLET ORAL DAILY
Qty: 30 TABLET | Refills: 0 | Status: SHIPPED | OUTPATIENT
Start: 2022-04-29 | End: 2022-07-01

## 2022-07-01 ENCOUNTER — MYC REFILL (OUTPATIENT)
Dept: INTERNAL MEDICINE | Facility: CLINIC | Age: 47
End: 2022-07-01

## 2022-07-01 DIAGNOSIS — E03.9 HYPOTHYROIDISM, UNSPECIFIED TYPE: ICD-10-CM

## 2022-07-02 NOTE — TELEPHONE ENCOUNTER
"Routing refill request to provider for review/approval because:  Labs not current:  TSH  Patient needs to be seen because it has been more than 1 year since last office visit.    Last Written Prescription Date:  4/29/22  Last Fill Quantity: 30,  # refills: 0   Last office visit provider:  3/18/21     Requested Prescriptions   Pending Prescriptions Disp Refills     levothyroxine (SYNTHROID/LEVOTHROID) 175 MCG tablet 30 tablet 0     Sig: Take 1 tablet (175 mcg) by mouth daily       Thyroid Protocol Failed - 7/1/2022  3:47 PM        Failed - Recent (12 mo) or future (30 days) visit within the authorizing provider's specialty     Patient has had an office visit with the authorizing provider or a provider within the authorizing providers department within the previous 12 mos or has a future within next 30 days. See \"Patient Info\" tab in inbasket, or \"Choose Columns\" in Meds & Orders section of the refill encounter.              Failed - Normal TSH on file in past 12 months     Recent Labs   Lab Test 03/18/21  1459   TSH 0.25*              Passed - Patient is 12 years or older        Passed - Medication is active on med list             Fang Craft 07/02/22 2:15 PM  "

## 2022-07-05 RX ORDER — LEVOTHYROXINE SODIUM 175 UG/1
175 TABLET ORAL DAILY
Qty: 30 TABLET | Refills: 0 | Status: SHIPPED | OUTPATIENT
Start: 2022-07-05 | End: 2022-07-15

## 2022-07-14 ASSESSMENT — ENCOUNTER SYMPTOMS
DIZZINESS: 0
HEARTBURN: 0
FREQUENCY: 0
WEAKNESS: 0
ABDOMINAL PAIN: 0
DIARRHEA: 0
HEADACHES: 0
NERVOUS/ANXIOUS: 0
HEMATURIA: 0
CONSTIPATION: 0
DYSURIA: 0
PALPITATIONS: 0
COUGH: 0
SHORTNESS OF BREATH: 0
EYE PAIN: 0
SORE THROAT: 0
MYALGIAS: 1
PARESTHESIAS: 0
JOINT SWELLING: 1
ARTHRALGIAS: 1
FEVER: 0
HEMATOCHEZIA: 0
CHILLS: 0

## 2022-07-15 ENCOUNTER — OFFICE VISIT (OUTPATIENT)
Dept: INTERNAL MEDICINE | Facility: CLINIC | Age: 47
End: 2022-07-15
Payer: COMMERCIAL

## 2022-07-15 VITALS
BODY MASS INDEX: 31.77 KG/M2 | DIASTOLIC BLOOD PRESSURE: 68 MMHG | TEMPERATURE: 98.5 F | RESPIRATION RATE: 20 BRPM | SYSTOLIC BLOOD PRESSURE: 132 MMHG | HEART RATE: 81 BPM | OXYGEN SATURATION: 97 % | WEIGHT: 202.4 LBS | HEIGHT: 67 IN

## 2022-07-15 DIAGNOSIS — R73.01 ELEVATED FASTING BLOOD SUGAR: ICD-10-CM

## 2022-07-15 DIAGNOSIS — E55.9 VITAMIN D DEFICIENCY: ICD-10-CM

## 2022-07-15 DIAGNOSIS — Z12.11 SCREEN FOR COLON CANCER: Primary | ICD-10-CM

## 2022-07-15 DIAGNOSIS — E78.00 HYPERCHOLESTEREMIA: ICD-10-CM

## 2022-07-15 DIAGNOSIS — L40.50 PSORIATIC ARTHRITIS (H): ICD-10-CM

## 2022-07-15 DIAGNOSIS — G47.30 SLEEP APNEA, UNSPECIFIED TYPE: ICD-10-CM

## 2022-07-15 DIAGNOSIS — E03.9 HYPOTHYROIDISM, UNSPECIFIED TYPE: ICD-10-CM

## 2022-07-15 LAB
ALBUMIN SERPL BCG-MCNC: 4.9 G/DL (ref 3.5–5.2)
ALBUMIN UR-MCNC: NEGATIVE MG/DL
ALP SERPL-CCNC: 48 U/L (ref 40–129)
ALT SERPL W P-5'-P-CCNC: 34 U/L (ref 10–50)
AMORPH CRY #/AREA URNS HPF: ABNORMAL /HPF
ANION GAP SERPL CALCULATED.3IONS-SCNC: 11 MMOL/L (ref 7–15)
APPEARANCE UR: CLEAR
AST SERPL W P-5'-P-CCNC: 25 U/L (ref 10–50)
BACTERIA #/AREA URNS HPF: ABNORMAL /HPF
BASOPHILS # BLD AUTO: 0 10E3/UL (ref 0–0.2)
BASOPHILS NFR BLD AUTO: 1 %
BILIRUB SERPL-MCNC: 0.5 MG/DL
BILIRUB UR QL STRIP: NEGATIVE
BUN SERPL-MCNC: 11.3 MG/DL (ref 6–20)
CALCIUM SERPL-MCNC: 10 MG/DL (ref 8.6–10)
CHLORIDE SERPL-SCNC: 100 MMOL/L (ref 98–107)
CHOLEST SERPL-MCNC: 265 MG/DL
COLOR UR AUTO: YELLOW
CREAT SERPL-MCNC: 0.98 MG/DL (ref 0.67–1.17)
DEPRECATED CALCIDIOL+CALCIFEROL SERPL-MC: 28 UG/L (ref 20–75)
DEPRECATED HCO3 PLAS-SCNC: 27 MMOL/L (ref 22–29)
EOSINOPHIL # BLD AUTO: 0.1 10E3/UL (ref 0–0.7)
EOSINOPHIL NFR BLD AUTO: 1 %
ERYTHROCYTE [DISTWIDTH] IN BLOOD BY AUTOMATED COUNT: 13.1 % (ref 10–15)
GFR SERPL CREATININE-BSD FRML MDRD: >90 ML/MIN/1.73M2
GLUCOSE SERPL-MCNC: 129 MG/DL (ref 70–99)
GLUCOSE UR STRIP-MCNC: NEGATIVE MG/DL
HBA1C MFR BLD: 5.8 % (ref 0–5.6)
HCT VFR BLD AUTO: 46.1 % (ref 40–53)
HDLC SERPL-MCNC: 47 MG/DL
HGB BLD-MCNC: 15.5 G/DL (ref 13.3–17.7)
HGB UR QL STRIP: ABNORMAL
IMM GRANULOCYTES # BLD: 0 10E3/UL
IMM GRANULOCYTES NFR BLD: 0 %
KETONES UR STRIP-MCNC: NEGATIVE MG/DL
LDLC SERPL CALC-MCNC: 194 MG/DL
LEUKOCYTE ESTERASE UR QL STRIP: NEGATIVE
LYMPHOCYTES # BLD AUTO: 1.3 10E3/UL (ref 0.8–5.3)
LYMPHOCYTES NFR BLD AUTO: 23 %
MCH RBC QN AUTO: 28.1 PG (ref 26.5–33)
MCHC RBC AUTO-ENTMCNC: 33.6 G/DL (ref 31.5–36.5)
MCV RBC AUTO: 84 FL (ref 78–100)
MONOCYTES # BLD AUTO: 0.5 10E3/UL (ref 0–1.3)
MONOCYTES NFR BLD AUTO: 8 %
NEUTROPHILS # BLD AUTO: 3.8 10E3/UL (ref 1.6–8.3)
NEUTROPHILS NFR BLD AUTO: 67 %
NITRATE UR QL: NEGATIVE
NONHDLC SERPL-MCNC: 218 MG/DL
PH UR STRIP: 8 [PH] (ref 5–8)
PLATELET # BLD AUTO: 284 10E3/UL (ref 150–450)
POTASSIUM SERPL-SCNC: 4.7 MMOL/L (ref 3.4–5.3)
PROT SERPL-MCNC: 7.9 G/DL (ref 6.4–8.3)
RBC # BLD AUTO: 5.51 10E6/UL (ref 4.4–5.9)
RBC #/AREA URNS AUTO: ABNORMAL /HPF
SODIUM SERPL-SCNC: 138 MMOL/L (ref 136–145)
SP GR UR STRIP: 1.01 (ref 1–1.03)
SQUAMOUS #/AREA URNS AUTO: ABNORMAL /LPF
TRIGL SERPL-MCNC: 122 MG/DL
UROBILINOGEN UR STRIP-ACNC: 0.2 E.U./DL
WBC # BLD AUTO: 5.7 10E3/UL (ref 4–11)
WBC #/AREA URNS AUTO: ABNORMAL /HPF

## 2022-07-15 PROCEDURE — 99396 PREV VISIT EST AGE 40-64: CPT | Performed by: NURSE PRACTITIONER

## 2022-07-15 PROCEDURE — 81001 URINALYSIS AUTO W/SCOPE: CPT | Performed by: NURSE PRACTITIONER

## 2022-07-15 PROCEDURE — 80061 LIPID PANEL: CPT | Performed by: NURSE PRACTITIONER

## 2022-07-15 PROCEDURE — 36415 COLL VENOUS BLD VENIPUNCTURE: CPT | Performed by: NURSE PRACTITIONER

## 2022-07-15 PROCEDURE — 82306 VITAMIN D 25 HYDROXY: CPT | Performed by: NURSE PRACTITIONER

## 2022-07-15 PROCEDURE — 80053 COMPREHEN METABOLIC PANEL: CPT | Performed by: NURSE PRACTITIONER

## 2022-07-15 PROCEDURE — 83036 HEMOGLOBIN GLYCOSYLATED A1C: CPT | Performed by: NURSE PRACTITIONER

## 2022-07-15 PROCEDURE — 85025 COMPLETE CBC W/AUTO DIFF WBC: CPT | Performed by: NURSE PRACTITIONER

## 2022-07-15 RX ORDER — FOLIC ACID 1 MG/1
1000 TABLET ORAL DAILY
COMMUNITY
Start: 2022-06-17

## 2022-07-15 RX ORDER — METHOTREXATE 25 MG/ML
INJECTION INTRA-ARTERIAL; INTRAMUSCULAR; INTRATHECAL; INTRAVENOUS
COMMUNITY
Start: 2022-06-29 | End: 2022-07-15

## 2022-07-15 RX ORDER — NAPROXEN 500 MG/1
500 TABLET ORAL 2 TIMES DAILY
COMMUNITY
Start: 2022-06-16

## 2022-07-15 RX ORDER — SECUKINUMAB 150 MG/ML
INJECTION SUBCUTANEOUS
Qty: 1 ML | Refills: 0 | COMMUNITY
Start: 2022-07-15 | End: 2023-07-19

## 2022-07-15 RX ORDER — LEVOTHYROXINE SODIUM 175 UG/1
175 TABLET ORAL DAILY
Qty: 90 TABLET | Refills: 3 | Status: SHIPPED | OUTPATIENT
Start: 2022-07-15 | End: 2023-07-27

## 2022-07-15 ASSESSMENT — ENCOUNTER SYMPTOMS
ABDOMINAL PAIN: 0
PALPITATIONS: 0
DIZZINESS: 0
ARTHRALGIAS: 1
CONSTIPATION: 0
NERVOUS/ANXIOUS: 0
COUGH: 0
PARESTHESIAS: 0
HEARTBURN: 0
SHORTNESS OF BREATH: 0
FREQUENCY: 0
JOINT SWELLING: 1
HEMATOCHEZIA: 0
HEADACHES: 0
WEAKNESS: 0
SORE THROAT: 0
DYSURIA: 0
CHILLS: 0
EYE PAIN: 0
MYALGIAS: 1
HEMATURIA: 0
DIARRHEA: 0
FEVER: 0

## 2022-07-15 ASSESSMENT — PAIN SCALES - GENERAL: PAINLEVEL: MILD PAIN (3)

## 2022-07-15 NOTE — PROGRESS NOTES
SUBJECTIVE:   CC: Robert Garduno is an 46 year old male who presents for preventative health visit.     Healthy Habits:     Getting at least 3 servings of Calcium per day:  Yes    Bi-annual eye exam:  Yes    Dental care twice a year:  NO    Sleep apnea or symptoms of sleep apnea:  Sleep apnea    Diet:  Gluten-free/reduced    Frequency of exercise:  4-5 days/week    Duration of exercise:  30-45 minutes    Taking medications regularly:  Yes    Medication side effects:  None    PHQ-2 Total Score: 0    Additional concerns today:  No            Today's PHQ-2 Score:   PHQ-2 ( 1999 [employer]) 7/14/2022   Q1: Little interest or pleasure in doing things 0   Q2: Feeling down, depressed or hopeless 0   PHQ-2 Score 0   Q1: Little interest or pleasure in doing things Not at all   Q2: Feeling down, depressed or hopeless Not at all   PHQ-2 Score 0     Wt Readings from Last 5 Encounters:   07/15/22 91.8 kg (202 lb 6.4 oz)   03/18/21 104.3 kg (230 lb)   09/17/20 93.9 kg (207 lb)   06/17/20 95.3 kg (210 lb)   02/07/20 94.8 kg (209 lb)       Abuse: Current or Past(Physical, Sexual or Emotional)- No  Do you feel safe in your environment? Yes    Have you ever done Advance Care Planning? (For example, a Health Directive, POLST, or a discussion with a medical provider or your loved ones about your wishes): No, advance care planning information given to patient to review.  Patient declined advance care planning discussion at this time.    Social History     Tobacco Use     Smoking status: Never Smoker     Smokeless tobacco: Never Used   Substance Use Topics     Alcohol use: Yes     Comment: Alcoholic Drinks/day: rare         Alcohol Use 7/14/2022   Prescreen: >3 drinks/day or >7 drinks/week? No       Last PSA: No results found for: PSA    Reviewed orders with patient. Reviewed health maintenance and updated orders accordingly - Yes  BP Readings from Last 3 Encounters:   07/15/22 132/68   03/18/21 132/78   02/07/20 130/82    Wt Readings from  Last 3 Encounters:   07/15/22 91.8 kg (202 lb 6.4 oz)   03/18/21 104.3 kg (230 lb)   09/17/20 93.9 kg (207 lb)                  There is no problem list on file for this patient.    Past Surgical History:   Procedure Laterality Date     HAND SURGERY       INSERT INTRACORONARY STENT         Social History     Tobacco Use     Smoking status: Never Smoker     Smokeless tobacco: Never Used   Substance Use Topics     Alcohol use: Yes     Comment: Alcoholic Drinks/day: rare     Family History   Problem Relation Age of Onset     Alcoholism Mother      Hypothyroidism Mother      Hypothyroidism Father      Hypertension Father      Sleep Apnea Father      Hypothyroidism Sister      Diabetes Type 2  Sister      Multiple Sclerosis Brother          Current Outpatient Medications   Medication Sig Dispense Refill     folic acid (FOLVITE) 1 MG tablet Take 1,000 mcg by mouth daily       levothyroxine (SYNTHROID/LEVOTHROID) 175 MCG tablet Take 1 tablet (175 mcg) by mouth daily 90 tablet 3     Multiple Vitamins-Minerals (VITAMIN D3 COMPLETE PO)        naproxen (NAPROSYN) 500 MG tablet Take 500 mg by mouth 2 times daily       secukinumab (COSENTYX) 150 MG/ML prefilled syringe 150mg twice monthly 1 mL 0     No Known Allergies    Reviewed and updated as needed this visit by clinical staff   Tobacco  Allergies  Meds  Problems  Med Hx  Surg Hx  Fam Hx            Reviewed and updated as needed this visit by Provider   Tobacco  Allergies  Meds  Problems  Med Hx  Surg Hx  Fam Hx           Past Medical History:   Diagnosis Date     Hashimoto's thyroiditis      Hypothyroidism      ARSEN (obstructive sleep apnea)       Past Surgical History:   Procedure Laterality Date     HAND SURGERY       INSERT INTRACORONARY STENT         Review of Systems   Constitutional: Negative for chills and fever.   HENT: Negative for congestion, ear pain, hearing loss and sore throat.    Eyes: Negative for pain and visual disturbance.   Respiratory:  "Negative for cough and shortness of breath.    Cardiovascular: Negative for chest pain, palpitations and peripheral edema.   Gastrointestinal: Negative for abdominal pain, constipation, diarrhea, heartburn and hematochezia.   Genitourinary: Negative for dysuria, frequency, genital sores, hematuria, impotence, penile discharge and urgency.   Musculoskeletal: Positive for arthralgias, joint swelling and myalgias.   Skin: Negative for rash.   Neurological: Negative for dizziness, weakness, headaches and paresthesias.   Psychiatric/Behavioral: Negative for mood changes. The patient is not nervous/anxious.          OBJECTIVE:   /68 (BP Location: Right arm, Patient Position: Sitting, Cuff Size: Adult Large)   Pulse 81   Temp 98.5  F (36.9  C) (Oral)   Resp 20   Ht 1.705 m (5' 7.13\")   Wt 91.8 kg (202 lb 6.4 oz)   SpO2 97%   BMI 31.58 kg/m      Physical Exam  GENERAL: healthy, alert and no distress  EYES: Eyes grossly normal to inspection, PERRL and conjunctivae and sclerae normal  HENT: ear canals and TM's normal, nose and mouth without ulcers or lesions  NECK: no adenopathy  RESP: lungs clear to auscultation - no rales, rhonchi or wheezes  CV: regular rate and rhythm, normal S1 S2,no peripheral edema and peripheral pulses strong  ABDOMEN: soft, nontender, no hepatosplenomegaly, no masses and bowel sounds normal  MS: no gross musculoskeletal defects noted, no edema  SKIN: no suspicious lesions or rashes  NEURO:  mentation intact and speech normal  PSYCH: mentation appears normal, affect normal/bright  LYMPH: no cervical, supraclavicularadenopathy        ASSESSMENT/PLAN:     Problem List Items Addressed This Visit    None     Visit Diagnoses     Screen for colon cancer    -  Primary    Hypothyroidism, unspecified type        Relevant Medications    levothyroxine (SYNTHROID/LEVOTHROID) 175 MCG tablet    Psoriatic arthritis (H)        Relevant Medications    naproxen (NAPROSYN) 500 MG tablet    secukinumab " "(COSENTYX) 150 MG/ML prefilled syringe    Other Relevant Orders    Comprehensive metabolic panel    CBC with platelets and differential (Completed)    UA Macro with Reflex to Micro and Culture - lab collect (Completed)    Urine Microscopic (Completed)    Sleep apnea, unspecified type        Hypercholesteremia        Relevant Orders    Lipid Profile (Chol, Trig, HDL, LDL calc)    Vitamin D deficiency        Relevant Orders    Vitamin D deficiency screening    Elevated fasting blood sugar        Relevant Orders    Hemoglobin A1c (Completed)              COUNSELING:   Reviewed preventive health counseling, as reflected in patient instructions       Vision screening       Colorectal cancer screening    Estimated body mass index is 31.58 kg/m  as calculated from the following:    Height as of this encounter: 1.705 m (5' 7.13\").    Weight as of this encounter: 91.8 kg (202 lb 6.4 oz).     Weight management plan: Patient has had significant weight loss secondary to diet and exercise changes he is congratulated    He reports that he has never smoked. He has never used smokeless tobacco.      Counseling Resources:  ATP IV Guidelines  Pooled Cohorts Equation Calculator  FRAX Risk Assessment  ICSI Preventive Guidelines  Dietary Guidelines for Americans, 2010  USDA's MyPlate  ASA Prophylaxis  Lung CA Screening    Natalie Hale NP  [facility]  "

## 2022-09-03 ENCOUNTER — HEALTH MAINTENANCE LETTER (OUTPATIENT)
Age: 47
End: 2022-09-03

## 2023-01-10 ENCOUNTER — TRANSFERRED RECORDS (OUTPATIENT)
Dept: HEALTH INFORMATION MANAGEMENT | Facility: CLINIC | Age: 48
End: 2023-01-10

## 2023-07-01 ENCOUNTER — TRANSFERRED RECORDS (OUTPATIENT)
Dept: HEALTH INFORMATION MANAGEMENT | Facility: CLINIC | Age: 48
End: 2023-07-01
Payer: COMMERCIAL

## 2023-07-06 ENCOUNTER — MYC MEDICAL ADVICE (OUTPATIENT)
Dept: INTERNAL MEDICINE | Facility: CLINIC | Age: 48
End: 2023-07-06
Payer: COMMERCIAL

## 2023-07-19 NOTE — TELEPHONE ENCOUNTER
LDVMTCB        Provider is out today. Will need to reschedule. Sending MyChart msg as well.       If pt calls back, please assist in rescheduling todays appt.       Thank you,  Herbie Ordonez Jr., CMA on 7/19/2023 at 7:03 AM

## 2023-07-26 ASSESSMENT — ENCOUNTER SYMPTOMS
HEMATOCHEZIA: 0
NAUSEA: 0
JOINT SWELLING: 1
SORE THROAT: 0
PARESTHESIAS: 0
MYALGIAS: 1
COUGH: 0
SHORTNESS OF BREATH: 0
HEADACHES: 0
HEARTBURN: 0
HEMATURIA: 0
CHILLS: 0
CONSTIPATION: 0
ARTHRALGIAS: 1
NERVOUS/ANXIOUS: 0
FREQUENCY: 0
EYE PAIN: 0
PALPITATIONS: 0
DIARRHEA: 0
DYSURIA: 0
ABDOMINAL PAIN: 0
DIZZINESS: 0
FEVER: 0
WEAKNESS: 0

## 2023-07-27 ENCOUNTER — OFFICE VISIT (OUTPATIENT)
Dept: INTERNAL MEDICINE | Facility: CLINIC | Age: 48
End: 2023-07-27
Payer: COMMERCIAL

## 2023-07-27 VITALS
RESPIRATION RATE: 12 BRPM | SYSTOLIC BLOOD PRESSURE: 130 MMHG | HEIGHT: 67 IN | BODY MASS INDEX: 34.44 KG/M2 | TEMPERATURE: 98 F | DIASTOLIC BLOOD PRESSURE: 68 MMHG | OXYGEN SATURATION: 98 % | WEIGHT: 219.4 LBS | HEART RATE: 65 BPM

## 2023-07-27 DIAGNOSIS — E03.9 HYPOTHYROIDISM, UNSPECIFIED TYPE: ICD-10-CM

## 2023-07-27 DIAGNOSIS — Z12.5 SCREENING FOR PROSTATE CANCER: ICD-10-CM

## 2023-07-27 DIAGNOSIS — R79.89 LOW SERUM TESTOSTERONE LEVEL IN MALE: ICD-10-CM

## 2023-07-27 DIAGNOSIS — Z12.11 SCREEN FOR COLON CANCER: ICD-10-CM

## 2023-07-27 DIAGNOSIS — E55.9 VITAMIN D DEFICIENCY: ICD-10-CM

## 2023-07-27 DIAGNOSIS — Z00.00 ANNUAL PHYSICAL EXAM: Primary | ICD-10-CM

## 2023-07-27 DIAGNOSIS — E78.00 HYPERCHOLESTEREMIA: ICD-10-CM

## 2023-07-27 DIAGNOSIS — R73.01 ELEVATED FASTING BLOOD SUGAR: ICD-10-CM

## 2023-07-27 DIAGNOSIS — G47.30 SLEEP APNEA, UNSPECIFIED TYPE: ICD-10-CM

## 2023-07-27 DIAGNOSIS — R53.83 OTHER FATIGUE: ICD-10-CM

## 2023-07-27 DIAGNOSIS — L40.50 PSORIATIC ARTHRITIS (H): ICD-10-CM

## 2023-07-27 LAB
ALBUMIN SERPL BCG-MCNC: 5 G/DL (ref 3.5–5.2)
ALBUMIN UR-MCNC: NEGATIVE MG/DL
ALP SERPL-CCNC: 42 U/L (ref 40–129)
ALT SERPL W P-5'-P-CCNC: 26 U/L (ref 0–70)
ANION GAP SERPL CALCULATED.3IONS-SCNC: 10 MMOL/L (ref 7–15)
APPEARANCE UR: CLEAR
AST SERPL W P-5'-P-CCNC: 23 U/L (ref 0–45)
BASOPHILS # BLD AUTO: 0 10E3/UL (ref 0–0.2)
BASOPHILS NFR BLD AUTO: 1 %
BILIRUB SERPL-MCNC: 0.5 MG/DL
BILIRUB UR QL STRIP: NEGATIVE
BUN SERPL-MCNC: 10.9 MG/DL (ref 6–20)
CALCIUM SERPL-MCNC: 9.9 MG/DL (ref 8.6–10)
CHLORIDE SERPL-SCNC: 103 MMOL/L (ref 98–107)
CHOLEST SERPL-MCNC: 268 MG/DL
COLOR UR AUTO: YELLOW
CREAT SERPL-MCNC: 1.06 MG/DL (ref 0.67–1.17)
DEPRECATED CALCIDIOL+CALCIFEROL SERPL-MC: 30 UG/L (ref 20–75)
DEPRECATED HCO3 PLAS-SCNC: 26 MMOL/L (ref 22–29)
EOSINOPHIL # BLD AUTO: 0.1 10E3/UL (ref 0–0.7)
EOSINOPHIL NFR BLD AUTO: 2 %
ERYTHROCYTE [DISTWIDTH] IN BLOOD BY AUTOMATED COUNT: 13.1 % (ref 10–15)
FERRITIN SERPL-MCNC: 224 NG/ML (ref 31–409)
GFR SERPL CREATININE-BSD FRML MDRD: 88 ML/MIN/1.73M2
GLUCOSE SERPL-MCNC: 120 MG/DL (ref 70–99)
GLUCOSE UR STRIP-MCNC: NEGATIVE MG/DL
HBA1C MFR BLD: 5.9 % (ref 0–5.6)
HCT VFR BLD AUTO: 43.7 % (ref 40–53)
HDLC SERPL-MCNC: 47 MG/DL
HGB BLD-MCNC: 14.9 G/DL (ref 13.3–17.7)
HGB UR QL STRIP: NEGATIVE
IMM GRANULOCYTES # BLD: 0 10E3/UL
IMM GRANULOCYTES NFR BLD: 0 %
IRON BINDING CAPACITY (ROCHE): 335 UG/DL (ref 240–430)
IRON SATN MFR SERPL: 31 % (ref 15–46)
IRON SERPL-MCNC: 104 UG/DL (ref 61–157)
KETONES UR STRIP-MCNC: NEGATIVE MG/DL
LDLC SERPL CALC-MCNC: 197 MG/DL
LEUKOCYTE ESTERASE UR QL STRIP: NEGATIVE
LYMPHOCYTES # BLD AUTO: 1.6 10E3/UL (ref 0.8–5.3)
LYMPHOCYTES NFR BLD AUTO: 31 %
MCH RBC QN AUTO: 28.3 PG (ref 26.5–33)
MCHC RBC AUTO-ENTMCNC: 34.1 G/DL (ref 31.5–36.5)
MCV RBC AUTO: 83 FL (ref 78–100)
MONOCYTES # BLD AUTO: 0.5 10E3/UL (ref 0–1.3)
MONOCYTES NFR BLD AUTO: 10 %
NEUTROPHILS # BLD AUTO: 2.9 10E3/UL (ref 1.6–8.3)
NEUTROPHILS NFR BLD AUTO: 57 %
NITRATE UR QL: NEGATIVE
NONHDLC SERPL-MCNC: 221 MG/DL
PH UR STRIP: 7 [PH] (ref 5–8)
PLATELET # BLD AUTO: 244 10E3/UL (ref 150–450)
POTASSIUM SERPL-SCNC: 4.5 MMOL/L (ref 3.4–5.3)
PROT SERPL-MCNC: 7.6 G/DL (ref 6.4–8.3)
PSA SERPL DL<=0.01 NG/ML-MCNC: 0.83 NG/ML (ref 0–2.5)
RBC # BLD AUTO: 5.26 10E6/UL (ref 4.4–5.9)
SODIUM SERPL-SCNC: 139 MMOL/L (ref 136–145)
SP GR UR STRIP: 1.01 (ref 1–1.03)
TRIGL SERPL-MCNC: 119 MG/DL
UROBILINOGEN UR STRIP-ACNC: 0.2 E.U./DL
VIT B12 SERPL-MCNC: 1040 PG/ML (ref 232–1245)
WBC # BLD AUTO: 5.1 10E3/UL (ref 4–11)

## 2023-07-27 PROCEDURE — 83540 ASSAY OF IRON: CPT | Performed by: NURSE PRACTITIONER

## 2023-07-27 PROCEDURE — 36415 COLL VENOUS BLD VENIPUNCTURE: CPT | Performed by: NURSE PRACTITIONER

## 2023-07-27 PROCEDURE — 85025 COMPLETE CBC W/AUTO DIFF WBC: CPT | Performed by: NURSE PRACTITIONER

## 2023-07-27 PROCEDURE — 81003 URINALYSIS AUTO W/O SCOPE: CPT | Performed by: NURSE PRACTITIONER

## 2023-07-27 PROCEDURE — 80053 COMPREHEN METABOLIC PANEL: CPT | Performed by: NURSE PRACTITIONER

## 2023-07-27 PROCEDURE — G0103 PSA SCREENING: HCPCS | Performed by: NURSE PRACTITIONER

## 2023-07-27 PROCEDURE — 83036 HEMOGLOBIN GLYCOSYLATED A1C: CPT | Performed by: NURSE PRACTITIONER

## 2023-07-27 PROCEDURE — 99396 PREV VISIT EST AGE 40-64: CPT | Performed by: NURSE PRACTITIONER

## 2023-07-27 PROCEDURE — 80061 LIPID PANEL: CPT | Performed by: NURSE PRACTITIONER

## 2023-07-27 PROCEDURE — 82607 VITAMIN B-12: CPT | Performed by: NURSE PRACTITIONER

## 2023-07-27 PROCEDURE — 83550 IRON BINDING TEST: CPT | Performed by: NURSE PRACTITIONER

## 2023-07-27 PROCEDURE — 82306 VITAMIN D 25 HYDROXY: CPT | Performed by: NURSE PRACTITIONER

## 2023-07-27 PROCEDURE — 82728 ASSAY OF FERRITIN: CPT | Performed by: NURSE PRACTITIONER

## 2023-07-27 RX ORDER — MELOXICAM 7.5 MG/1
7.5 TABLET ORAL 2 TIMES DAILY
COMMUNITY

## 2023-07-27 RX ORDER — GUSELKUMAB 100 MG/ML
INJECTION SUBCUTANEOUS
COMMUNITY
Start: 2023-04-13

## 2023-07-27 RX ORDER — TESTOSTERONE GEL, 1% 10 MG/G
GEL TRANSDERMAL DAILY
Status: CANCELLED | OUTPATIENT
Start: 2023-07-27

## 2023-07-27 RX ORDER — LEVOTHYROXINE SODIUM 175 UG/1
175 TABLET ORAL DAILY
Qty: 90 TABLET | Refills: 3 | Status: SHIPPED | OUTPATIENT
Start: 2023-07-27

## 2023-07-27 ASSESSMENT — ENCOUNTER SYMPTOMS
HEADACHES: 0
FREQUENCY: 0
ABDOMINAL PAIN: 0
PALPITATIONS: 0
DIARRHEA: 0
JOINT SWELLING: 1
HEARTBURN: 0
COUGH: 0
DIZZINESS: 0
NAUSEA: 0
DYSURIA: 0
NERVOUS/ANXIOUS: 0
CONSTIPATION: 0
SHORTNESS OF BREATH: 0
WEAKNESS: 0
HEMATURIA: 0
EYE PAIN: 0
ARTHRALGIAS: 1
MYALGIAS: 1
SORE THROAT: 0
FEVER: 0
CHILLS: 0
PARESTHESIAS: 0
HEMATOCHEZIA: 0

## 2023-07-27 NOTE — PROGRESS NOTES
SUBJECTIVE:   CC: Robert is an 47 year old who presents for preventative health visit.       7/27/2023     7:20 AM   Additional Questions   Roomed by Corinne RUELAS CMA   Accompanied by N/A       Healthy Habits:     Getting at least 3 servings of Calcium per day:  Yes    Bi-annual eye exam:  Yes    Dental care twice a year:  NO    Sleep apnea or symptoms of sleep apnea:  Daytime drowsiness and Sleep apnea    Diet:  Carbohydrate counting and Gluten-free/reduced    Frequency of exercise:  4-5 days/week    Duration of exercise:  45-60 minutes    Taking medications regularly:  Yes    Medication side effects:  None    Additional concerns today:  No      Today's PHQ-2 Score:       7/19/2023     6:04 AM   PHQ-2 ( 1999 [employer])   Q1: Little interest or pleasure in doing things 0    0   Q2: Feeling down, depressed or hopeless 0    0   PHQ-2 Score 0    0   Q1: Little interest or pleasure in doing things Not at all   Q2: Feeling down, depressed or hopeless Not at all   PHQ-2 Score 0           Social History     Tobacco Use    Smoking status: Never    Smokeless tobacco: Never   Substance Use Topics    Alcohol use: Not Currently     Comment: I dont have any desire.             7/26/2023     9:24 PM   Alcohol Use   Prescreen: >3 drinks/day or >7 drinks/week? No       Last PSA: No results found for: PSA    Reviewed orders with patient. Reviewed health maintenance and updated orders accordingly - Yes  BP Readings from Last 3 Encounters:   07/27/23 130/68   07/15/22 132/68   03/18/21 132/78    Wt Readings from Last 3 Encounters:   07/27/23 99.5 kg (219 lb 6.4 oz)   07/15/22 91.8 kg (202 lb 6.4 oz)   03/18/21 104.3 kg (230 lb)                  There is no problem list on file for this patient.    Past Surgical History:   Procedure Laterality Date    ENT SURGERY  30 years ago    Tubes    HAND SURGERY      INSERT INTRACORONARY STENT         Social History     Tobacco Use    Smoking status: Never    Smokeless tobacco: Never   Substance Use  Topics    Alcohol use: Not Currently     Comment: I dont have any desire.     Family History   Problem Relation Age of Onset    Alcoholism Mother     Hypothyroidism Mother     Hypothyroidism Father     Hypertension Father     Sleep Apnea Father     Thyroid Disease Father     Hypothyroidism Sister     Diabetes Type 2  Sister     Multiple Sclerosis Brother          Current Outpatient Medications   Medication Sig Dispense Refill    folic acid (FOLVITE) 1 MG tablet Take 1,000 mcg by mouth daily      levothyroxine (SYNTHROID/LEVOTHROID) 175 MCG tablet Take 1 tablet (175 mcg) by mouth daily 90 tablet 3    meloxicam (MOBIC) 7.5 MG tablet Take 7.5 mg by mouth 2 times daily      Multiple Vitamins-Minerals (VITAMIN D3 COMPLETE PO)       naproxen (NAPROSYN) 500 MG tablet Take 500 mg by mouth 2 times daily      TESTOSTERONE IN VANICREAM 1%, 10MG /GM, TD CREAM Apply topically 0.2ml (2mg) every morning 6 mL 2    TREMFYA 100 MG/ML SOPN inject 1 milliliter by subcutaneous route  every 8 weeks       No Known Allergies  Recent Labs   Lab Test 07/27/23  0815 07/15/22  0821 03/18/21  1459 12/09/20  0740 09/28/20  0654 02/07/20  1234 07/25/19  0702 05/02/19  0933 05/02/19  0933   A1C 5.9* 5.8*  --   --  5.4 5.2  --    < >  --    LDL  --  194*  --   --  218*  --  209*  --  237*   HDL  --  47  --   --  42  --   --   --  49   TRIG  --  122  --   --  149  --   --   --  144   ALT  --  34  --   --  43 50* 54*  --  69*   CR  --  0.98  --   --  1.02 0.85 1.00  --  0.94   GFRESTIMATED  --  >90  --   --  >60 >60 >60  --  >60   GFRESTBLACK  --   --   --   --  >60 >60 >60  --  >60   POTASSIUM  --  4.7  --   --  4.6 4.6 4.4  --  4.8   TSH  --   --  0.25* 0.73 2.85  --  1.50  --  0.07*    < > = values in this interval not displayed.        Reviewed and updated as needed this visit by clinical staff   Tobacco  Allergies  Meds              Reviewed and updated as needed this visit by Provider                 Past Medical History:   Diagnosis Date     "Arthritis 3 years ago    Psoriatic Arthritis (inflammatory arthritis)    Hashimoto's thyroiditis     Hypothyroidism     ARSEN (obstructive sleep apnea)       Past Surgical History:   Procedure Laterality Date    ENT SURGERY  30 years ago    Tubes    HAND SURGERY      INSERT INTRACORONARY STENT         Review of Systems   Constitutional:  Negative for chills and fever.   HENT:  Negative for congestion, ear pain, hearing loss and sore throat.    Eyes:  Negative for pain and visual disturbance.   Respiratory:  Negative for cough and shortness of breath.    Cardiovascular:  Negative for chest pain, palpitations and peripheral edema.   Gastrointestinal:  Negative for abdominal pain, constipation, diarrhea, heartburn, hematochezia and nausea.   Genitourinary:  Negative for dysuria, frequency, genital sores, hematuria, impotence, penile discharge and urgency.   Musculoskeletal:  Positive for arthralgias, joint swelling and myalgias.   Skin:  Negative for rash.   Neurological:  Negative for dizziness, weakness, headaches and paresthesias.   Psychiatric/Behavioral:  Negative for mood changes. The patient is not nervous/anxious.          OBJECTIVE:   /68 (BP Location: Right arm, Patient Position: Sitting, Cuff Size: Adult Large)   Pulse 65   Temp 98  F (36.7  C) (Oral)   Resp 12   Ht 1.702 m (5' 7\")   Wt 99.5 kg (219 lb 6.4 oz)   SpO2 98%   BMI 34.36 kg/m      Physical Exam  GENERAL: healthy, alert and no distress  EYES: Eyes grossly normal to inspection, PERRL and conjunctivae and sclerae normal  HENT: ear canals and TM's normal, nose and mouth without ulcers or lesions  NECK: no adenopathy, no asymmetry, masses, or scars and thyroid normal to palpation  RESP: lungs clear to auscultation - no rales, rhonchi or wheezes  CV: regular rate and rhythm, normal S1 S2, no S3 or S4, no murmur, click or rub, no peripheral edema and peripheral pulses strong  ABDOMEN: soft, nontender, no hepatosplenomegaly, no masses and " "bowel sounds normal  MS: no gross musculoskeletal defects noted, no edema  SKIN: no suspicious lesions or rashes  NEURO: Normal strength and tone, mentation intact and speech normal  PSYCH: mentation appears normal, affect normal/bright  LYMPH: no cervical, supraclavicular, axillary, or inguinal adenopathy        ASSESSMENT/PLAN:     Problem List Items Addressed This Visit    None  Visit Diagnoses       Annual physical exam    -  Primary    Hypothyroidism, unspecified type        Relevant Medications    levothyroxine (SYNTHROID/LEVOTHROID) 175 MCG tablet    Screen for colon cancer        Psoriatic arthritis (H)        Relevant Medications    meloxicam (MOBIC) 7.5 MG tablet    Vitamin D deficiency        Hypercholesteremia        Relevant Orders    Lipid Profile (Chol, Trig, HDL, LDL calc)    Sleep apnea, unspecified type        Other fatigue        Relevant Orders    Comprehensive metabolic panel    CBC with platelets and differential (Completed)    UA Macroscopic with reflex to Microscopic and Culture - Lab Collect (Completed)    Vitamin B12    Iron and iron binding capacity    Ferritin    Vitamin D deficiency screening    Elevated fasting blood sugar        Relevant Orders    Hemoglobin A1c (Completed)    Low serum testosterone level in male        Relevant Medications    TESTOSTERONE IN VANICREAM 1%, 10MG /GM, TD CREAM    Screening for prostate cancer        Relevant Orders    PSA, screen        Patient has know low testosterone and low energy.  Will start on topical Testosterone and recheck in 8 weeks.       COUNSELING:   Reviewed preventive health counseling, as reflected in patient instructions      BMI:   Estimated body mass index is 34.36 kg/m  as calculated from the following:    Height as of this encounter: 1.702 m (5' 7\").    Weight as of this encounter: 99.5 kg (219 lb 6.4 oz).   Weight management plan:        He reports that he has never smoked. He has never used smokeless tobacco.            Natalie" OSVALDO Hale  [facility]

## 2023-09-27 NOTE — PROGRESS NOTES
Robert is a 48 year old who is being evaluated via a billable telephone visit.      What phone number would you like to be contacted at? Home   How would you like to obtain your AVS? Roberto Carlost    Distant Location (provider location):  Off-site    Assessment & Plan   Problem List Items Addressed This Visit    None  Visit Diagnoses       Low serum testosterone level in male    -  Primary                      MEDICATIONS:   Orders Placed This Encounter   Medications     TESTOSTERONE IN VANICREAM 1%, 10MG /GM, TD CREAM     Sig: Apply topically 0.4ml (4mg) daily     Dispense:  6 mL     Refill:  2     Medications Discontinued During This Encounter   Medication Reason     TESTOSTERONE IN VANICREAM 1%, 10MG /GM, TD CREAM Reorder (No AVS)       Natalie Hale NP  [facility]   Robert is a 48 year old, presenting for the following health issues:  No chief complaint on file.      History of Present Illness       Reason for visit:  Can we up my percentage of testosterone cream to 20 percentage 2 clicks 2 times a day. Im reading that is a starting dose for men using compounding cream. I still have a lot of fatigue. The pharmacist mentioned last dose was a female starting dose.    He eats 2-3 servings of fruits and vegetables daily.He consumes 0 sweetened beverage(s) daily.He exercises with enough effort to increase his heart rate 30 to 60 minutes per day.  He exercises with enough effort to increase his heart rate 5 days per week.   He is taking medications regularly.     Follow up for low testosterone.  Patient reports ongoing fatigue. Continues on Testosterone 2mg every morning.       Review of Systems   Unremarkable other than listed above and below         Objective           Vitals:  No vitals were obtained today due to virtual visit.    Physical Exam   healthy, alert, and no distress  PSYCH: Alert and oriented times 3; coherent speech, normal   rate and volume, able to articulate logical  thoughts, able   to abstract reason, no tangential thoughts, no hallucinations   or delusions  His affect is normal  RESP: No cough, no audible wheezing, able to talk in full sentences  Remainder of exam unable to be completed due to telephone visits    Office Visit on 07/27/2023   Component Date Value Ref Range Status     Ferritin 07/27/2023 224  31 - 409 ng/mL Final     Iron 07/27/2023 104  61 - 157 ug/dL Final     Iron Binding Capacity 07/27/2023 335  240 - 430 ug/dL Final     Iron Sat Index 07/27/2023 31  15 - 46 % Final     Vitamin B12 07/27/2023 1,040  232 - 1,245 pg/mL Final     Color Urine 07/27/2023 Yellow  Colorless, Straw, Light Yellow, Yellow Final     Appearance Urine 07/27/2023 Clear  Clear Final     Glucose Urine 07/27/2023 Negative  Negative mg/dL Final     Bilirubin Urine 07/27/2023 Negative  Negative Final     Ketones Urine 07/27/2023 Negative  Negative mg/dL Final     Specific Gravity Urine 07/27/2023 1.010  1.005 - 1.030 Final     Blood Urine 07/27/2023 Negative  Negative Final     pH Urine 07/27/2023 7.0  5.0 - 8.0 Final     Protein Albumin Urine 07/27/2023 Negative  Negative mg/dL Final     Urobilinogen Urine 07/27/2023 0.2  0.2, 1.0 E.U./dL Final     Nitrite Urine 07/27/2023 Negative  Negative Final     Leukocyte Esterase Urine 07/27/2023 Negative  Negative Final     Sodium 07/27/2023 139  136 - 145 mmol/L Final     Potassium 07/27/2023 4.5  3.4 - 5.3 mmol/L Final     Chloride 07/27/2023 103  98 - 107 mmol/L Final     Carbon Dioxide (CO2) 07/27/2023 26  22 - 29 mmol/L Final     Anion Gap 07/27/2023 10  7 - 15 mmol/L Final     Urea Nitrogen 07/27/2023 10.9  6.0 - 20.0 mg/dL Final     Creatinine 07/27/2023 1.06  0.67 - 1.17 mg/dL Final     Calcium 07/27/2023 9.9  8.6 - 10.0 mg/dL Final     Glucose 07/27/2023 120 (H)  70 - 99 mg/dL Final     Alkaline Phosphatase 07/27/2023 42  40 - 129 U/L Final     AST 07/27/2023 23  0 - 45 U/L Final    Reference intervals for this test were updated on 6/12/2023  to more accurately reflect our healthy population. There may be differences in the flagging of prior results with similar values performed with this method. Interpretation of those prior results can be made in the context of the updated reference intervals.     ALT 07/27/2023 26  0 - 70 U/L Final    Reference intervals for this test were updated on 6/12/2023 to more accurately reflect our healthy population. There may be differences in the flagging of prior results with similar values performed with this method. Interpretation of those prior results can be made in the context of the updated reference intervals.       Protein Total 07/27/2023 7.6  6.4 - 8.3 g/dL Final     Albumin 07/27/2023 5.0  3.5 - 5.2 g/dL Final     Bilirubin Total 07/27/2023 0.5  <=1.2 mg/dL Final     GFR Estimate 07/27/2023 88  >60 mL/min/1.73m2 Final     Cholesterol 07/27/2023 268 (H)  <200 mg/dL Final     Triglycerides 07/27/2023 119  <150 mg/dL Final     Direct Measure HDL 07/27/2023 47  >=40 mg/dL Final     LDL Cholesterol Calculated 07/27/2023 197 (H)  <=100 mg/dL Final     Non HDL Cholesterol 07/27/2023 221 (H)  <130 mg/dL Final     Hemoglobin A1C 07/27/2023 5.9 (H)  0.0 - 5.6 % Final    Normal <5.7%   Prediabetes 5.7-6.4%    Diabetes 6.5% or higher     Note: Adopted from ADA consensus guidelines.     Vitamin D, Total (25-Hydroxy) 07/27/2023 30  20 - 75 ug/L Final     WBC Count 07/27/2023 5.1  4.0 - 11.0 10e3/uL Final     RBC Count 07/27/2023 5.26  4.40 - 5.90 10e6/uL Final     Hemoglobin 07/27/2023 14.9  13.3 - 17.7 g/dL Final     Hematocrit 07/27/2023 43.7  40.0 - 53.0 % Final     MCV 07/27/2023 83  78 - 100 fL Final     MCH 07/27/2023 28.3  26.5 - 33.0 pg Final     MCHC 07/27/2023 34.1  31.5 - 36.5 g/dL Final     RDW 07/27/2023 13.1  10.0 - 15.0 % Final     Platelet Count 07/27/2023 244  150 - 450 10e3/uL Final     % Neutrophils 07/27/2023 57  % Final     % Lymphocytes 07/27/2023 31  % Final     % Monocytes 07/27/2023 10  % Final     %  Eosinophils 07/27/2023 2  % Final     % Basophils 07/27/2023 1  % Final     % Immature Granulocytes 07/27/2023 0  % Final     Absolute Neutrophils 07/27/2023 2.9  1.6 - 8.3 10e3/uL Final     Absolute Lymphocytes 07/27/2023 1.6  0.8 - 5.3 10e3/uL Final     Absolute Monocytes 07/27/2023 0.5  0.0 - 1.3 10e3/uL Final     Absolute Eosinophils 07/27/2023 0.1  0.0 - 0.7 10e3/uL Final     Absolute Basophils 07/27/2023 0.0  0.0 - 0.2 10e3/uL Final     Absolute Immature Granulocytes 07/27/2023 0.0  <=0.4 10e3/uL Final     Prostate Specific Antigen Screen 07/27/2023 0.83  0.00 - 2.50 ng/mL Final               Phone call duration: 7 minutes

## 2023-09-28 ENCOUNTER — VIRTUAL VISIT (OUTPATIENT)
Dept: INTERNAL MEDICINE | Facility: CLINIC | Age: 48
End: 2023-09-28
Payer: COMMERCIAL

## 2023-09-28 DIAGNOSIS — R79.89 LOW SERUM TESTOSTERONE LEVEL IN MALE: Primary | ICD-10-CM

## 2023-09-28 PROCEDURE — 99213 OFFICE O/P EST LOW 20 MIN: CPT | Mod: 95 | Performed by: NURSE PRACTITIONER

## 2023-11-01 ENCOUNTER — MYC MEDICAL ADVICE (OUTPATIENT)
Dept: INTERNAL MEDICINE | Facility: CLINIC | Age: 48
End: 2023-11-01
Payer: COMMERCIAL

## 2023-11-01 DIAGNOSIS — R79.89 LOW SERUM TESTOSTERONE LEVEL IN MALE: ICD-10-CM

## 2023-11-10 NOTE — TELEPHONE ENCOUNTER
We can reprint. Please let patient know I am not filling for 3 months as Savana is out of the office and his PCP. I will give one month and Savana should be able to fill for 3 months following this.

## 2024-04-09 ENCOUNTER — MYC MEDICAL ADVICE (OUTPATIENT)
Dept: INTERNAL MEDICINE | Facility: CLINIC | Age: 49
End: 2024-04-09

## 2024-04-09 ENCOUNTER — LAB REQUISITION (OUTPATIENT)
Dept: LAB | Facility: CLINIC | Age: 49
End: 2024-04-09
Payer: COMMERCIAL

## 2024-04-09 DIAGNOSIS — L30.8 OTHER SPECIFIED DERMATITIS: ICD-10-CM

## 2024-04-09 PROCEDURE — 87070 CULTURE OTHR SPECIMN AEROBIC: CPT | Mod: ORL | Performed by: DERMATOLOGY

## 2024-04-09 PROCEDURE — 87186 SC STD MICRODIL/AGAR DIL: CPT | Mod: ORL | Performed by: DERMATOLOGY

## 2024-04-11 LAB
BACTERIA SKIN AEROBE CULT: ABNORMAL
BACTERIA SPEC CULT: ABNORMAL
BACTERIA SPEC CULT: ABNORMAL

## 2024-09-14 ENCOUNTER — HEALTH MAINTENANCE LETTER (OUTPATIENT)
Age: 49
End: 2024-09-14

## 2025-02-17 ENCOUNTER — VIRTUAL VISIT (OUTPATIENT)
Dept: URGENT CARE | Facility: CLINIC | Age: 50
End: 2025-02-17
Payer: COMMERCIAL

## 2025-02-17 DIAGNOSIS — J01.10 ACUTE NON-RECURRENT FRONTAL SINUSITIS: Primary | ICD-10-CM

## 2025-02-17 PROCEDURE — 98005 SYNCH AUDIO-VIDEO EST LOW 20: CPT

## 2025-02-17 NOTE — PROGRESS NOTES
Robert is a 49 year old who is being evaluated via a billable video visit.    How would you like to obtain your AVS? MyChart  If the video visit is dropped, the invitation should be resent by: Text to cell phone: [phone]  Will anyone else be joining your video visit? No      Assessment & Plan     Acute non-recurrent frontal sinusitis    - amoxicillin-clavulanate (AUGMENTIN) 875-125 MG tablet; Take 1 tablet by mouth 2 times daily for 7 days.      SOFI Fu CNP      Subjective   Robert is a 49 year old, presenting for the following health issues:  SINUS    HPI   Sinus symptoms pressure headache congestion 8 days  Worsening  Hydrated  No respiratory concerns  No fever  Taking mucinex helps some        Objective           Vitals:  No vitals were obtained today due to virtual visit.    Physical Exam   GENERAL: alert and no distress  RESP: No audible wheeze, cough  SKIN: Visible skin clear.         Video-Visit Details  Time started 12:30 PM  Time ended 12:40PM  Type of service:  Video Visit   Originating Location (pt. Location): Home    Distant Location (provider location):  Off-site  Platform used for Video Visit: Julia  Signed Electronically by: [facility]

## 2025-03-11 ENCOUNTER — VIRTUAL VISIT (OUTPATIENT)
Dept: INTERNAL MEDICINE | Facility: CLINIC | Age: 50
End: 2025-03-11
Payer: COMMERCIAL

## 2025-03-11 DIAGNOSIS — R09.81 CONGESTION OF PARANASAL SINUS: ICD-10-CM

## 2025-03-11 DIAGNOSIS — R03.0 ELEVATED BLOOD PRESSURE READING WITHOUT DIAGNOSIS OF HYPERTENSION: ICD-10-CM

## 2025-03-11 DIAGNOSIS — Z12.11 SCREEN FOR COLON CANCER: Primary | ICD-10-CM

## 2025-03-11 PROCEDURE — 1126F AMNT PAIN NOTED NONE PRSNT: CPT | Performed by: NURSE PRACTITIONER

## 2025-03-11 PROCEDURE — 98005 SYNCH AUDIO-VIDEO EST LOW 20: CPT | Performed by: NURSE PRACTITIONER

## 2025-03-11 RX ORDER — CELECOXIB 200 MG/1
CAPSULE ORAL
COMMUNITY
Start: 2025-01-09

## 2025-03-11 RX ORDER — PREDNISONE 20 MG/1
20 TABLET ORAL DAILY
Qty: 5 TABLET | Refills: 0 | Status: SHIPPED | OUTPATIENT
Start: 2025-03-11

## 2025-03-11 RX ORDER — THYROID 120 MG/1
TABLET ORAL
COMMUNITY
Start: 2024-09-30

## 2025-03-11 NOTE — PROGRESS NOTES
Robert is a 49 year old who is being evaluated via a billable video visit.    How would you like to obtain your AVS? MyChart  If the video visit is dropped, the invitation should be resent by: Text to cell phone: [phone]  Will anyone else be joining your video visit? No      Assessment & Plan   Problem List Items Addressed This Visit    None  Visit Diagnoses       Screen for colon cancer    -  Primary    Relevant Orders    Colonoscopy Screening  Referral    Congestion of paranasal sinus        Relevant Medications    predniSONE (DELTASONE) 20 MG tablet    Elevated blood pressure reading without diagnosis of hypertension               160/90 blood pressure this morning and has been monitoring and is elevated at other times.         MEDICATIONS:   Orders Placed This Encounter   Medications    celecoxib (CELEBREX) 200 MG capsule    thyroid (ARMOUR) 120 MG tablet    predniSONE (DELTASONE) 20 MG tablet     Sig: Take 1 tablet (20 mg) by mouth daily.     Dispense:  5 tablet     Refill:  0     There are no discontinued medications.       - Continue other medications without change  FUTURE LABS:       - Schedule a fasting blood draw next week  FUTURE APPOINTMENTS:       - Follow-up visit in 1 week      Subjective   Robert is a 49 year old, presenting for the following health issues:  Hypertension (Thyroid medication has been increased and BP has increased since. ) and Sinus Problem (Sinus headache sinus pressure was given augmentin on 2/17/25. Sx have now returned. )        3/11/2025     6:51 AM   Additional Questions   Roomed by Peyton AUGUSTIN     History of Present Illness       Hypertension: He presents for follow up of hypertension.  He does check blood pressure  regularly outside of the clinic. Outside blood pressures have been over 140/90. He follows a low salt diet.     Hypothyroidism:     Since last visit, patient describes the following symptoms::  Fatigue and Weight gain    Weight gain::  5 lbs.    Reason  for visit:  Continue sinus/sore throat/ noticed BP creeping up since NP thyroid    He eats 2-3 servings of fruits and vegetables daily.He consumes 0 sweetened beverage(s) daily.He exercises with enough effort to increase his heart rate 30 to 60 minutes per day.  He exercises with enough effort to increase his heart rate 5 days per week.   He is taking medications regularly.                Review of Systems  Constitutional, HEENT, cardiovascular, pulmonary, GI, , musculoskeletal, neuro, skin, endocrine and psych systems are negative, except as otherwise noted.      Objective    Vitals - Patient Reported  Systolic (Patient Reported): (!) 154  Diastolic (Patient Reported): (!) 90  Pain Score: No Pain (0)      Vitals:  No vitals were obtained today due to virtual visit.    Physical Exam   GENERAL: alert and no distress  EYES: Eyes grossly normal to inspection.  No discharge or erythema, or obvious scleral/conjunctival abnormalities.  RESP: No audible wheeze, cough, or visible cyanosis.    SKIN: Visible skin clear. No significant rash, abnormal pigmentation or lesions.  NEURO: Cranial nerves grossly intact.  Mentation and speech appropriate for age.  PSYCH: Appropriate affect, tone, and pace of words          Video-Visit Details    Type of service:  Video Visit   Originating Location (pt. Location): Home    Distant Location (provider location):  Off-site  Platform used for Video Visit: Julia  Signed Electronically by: Natalie Hale NP

## 2025-03-16 SDOH — HEALTH STABILITY: PHYSICAL HEALTH: ON AVERAGE, HOW MANY DAYS PER WEEK DO YOU ENGAGE IN MODERATE TO STRENUOUS EXERCISE (LIKE A BRISK WALK)?: 5 DAYS

## 2025-03-16 SDOH — HEALTH STABILITY: PHYSICAL HEALTH: ON AVERAGE, HOW MANY MINUTES DO YOU ENGAGE IN EXERCISE AT THIS LEVEL?: 60 MIN

## 2025-03-16 ASSESSMENT — SOCIAL DETERMINANTS OF HEALTH (SDOH): HOW OFTEN DO YOU GET TOGETHER WITH FRIENDS OR RELATIVES?: ONCE A WEEK

## 2025-03-20 ENCOUNTER — OFFICE VISIT (OUTPATIENT)
Dept: INTERNAL MEDICINE | Facility: CLINIC | Age: 50
End: 2025-03-20
Payer: COMMERCIAL

## 2025-03-20 VITALS
OXYGEN SATURATION: 98 % | HEIGHT: 66 IN | SYSTOLIC BLOOD PRESSURE: 158 MMHG | TEMPERATURE: 97.7 F | HEART RATE: 71 BPM | BODY MASS INDEX: 37.41 KG/M2 | WEIGHT: 232.8 LBS | RESPIRATION RATE: 16 BRPM | DIASTOLIC BLOOD PRESSURE: 88 MMHG

## 2025-03-20 DIAGNOSIS — E55.9 VITAMIN D DEFICIENCY: ICD-10-CM

## 2025-03-20 DIAGNOSIS — Z00.00 ANNUAL PHYSICAL EXAM: Primary | ICD-10-CM

## 2025-03-20 DIAGNOSIS — G47.30 SLEEP APNEA, UNSPECIFIED TYPE: ICD-10-CM

## 2025-03-20 DIAGNOSIS — E78.00 HYPERCHOLESTEREMIA: ICD-10-CM

## 2025-03-20 DIAGNOSIS — E03.9 HYPOTHYROIDISM, UNSPECIFIED TYPE: ICD-10-CM

## 2025-03-20 DIAGNOSIS — I10 BENIGN ESSENTIAL HYPERTENSION: ICD-10-CM

## 2025-03-20 DIAGNOSIS — Z12.5 SCREENING FOR PROSTATE CANCER: ICD-10-CM

## 2025-03-20 DIAGNOSIS — L40.50 PSORIATIC ARTHRITIS (H): ICD-10-CM

## 2025-03-20 DIAGNOSIS — R79.89 LOW SERUM TESTOSTERONE LEVEL IN MALE: ICD-10-CM

## 2025-03-20 DIAGNOSIS — H61.21 IMPACTED CERUMEN OF RIGHT EAR: ICD-10-CM

## 2025-03-20 LAB
ALBUMIN UR-MCNC: NEGATIVE MG/DL
APPEARANCE UR: CLEAR
BACTERIA #/AREA URNS HPF: NORMAL /HPF
BASOPHILS # BLD AUTO: 0 10E3/UL (ref 0–0.2)
BASOPHILS NFR BLD AUTO: 0 %
BILIRUB UR QL STRIP: NEGATIVE
CHOLEST SERPL-MCNC: 308 MG/DL
COLOR UR AUTO: YELLOW
EOSINOPHIL # BLD AUTO: 0.1 10E3/UL (ref 0–0.7)
EOSINOPHIL NFR BLD AUTO: 2 %
ERYTHROCYTE [DISTWIDTH] IN BLOOD BY AUTOMATED COUNT: 14 % (ref 10–15)
FASTING STATUS PATIENT QL REPORTED: YES
GLUCOSE UR STRIP-MCNC: NEGATIVE MG/DL
HCT VFR BLD AUTO: 49.7 % (ref 40–53)
HDLC SERPL-MCNC: 46 MG/DL
HGB BLD-MCNC: 16.7 G/DL (ref 13.3–17.7)
HGB UR QL STRIP: ABNORMAL
IMM GRANULOCYTES # BLD: 0 10E3/UL
IMM GRANULOCYTES NFR BLD: 1 %
KETONES UR STRIP-MCNC: NEGATIVE MG/DL
LDLC SERPL CALC-MCNC: 231 MG/DL
LEUKOCYTE ESTERASE UR QL STRIP: NEGATIVE
LYMPHOCYTES # BLD AUTO: 2.1 10E3/UL (ref 0.8–5.3)
LYMPHOCYTES NFR BLD AUTO: 31 %
MCH RBC QN AUTO: 27.4 PG (ref 26.5–33)
MCHC RBC AUTO-ENTMCNC: 33.6 G/DL (ref 31.5–36.5)
MCV RBC AUTO: 82 FL (ref 78–100)
MONOCYTES # BLD AUTO: 0.7 10E3/UL (ref 0–1.3)
MONOCYTES NFR BLD AUTO: 10 %
NEUTROPHILS # BLD AUTO: 3.8 10E3/UL (ref 1.6–8.3)
NEUTROPHILS NFR BLD AUTO: 57 %
NITRATE UR QL: NEGATIVE
NONHDLC SERPL-MCNC: 262 MG/DL
PH UR STRIP: 6.5 [PH] (ref 5–8)
PLATELET # BLD AUTO: 194 10E3/UL (ref 150–450)
PSA SERPL DL<=0.01 NG/ML-MCNC: 0.98 NG/ML (ref 0–2.5)
RBC # BLD AUTO: 6.09 10E6/UL (ref 4.4–5.9)
RBC #/AREA URNS AUTO: NORMAL /HPF
SP GR UR STRIP: <=1.005 (ref 1–1.03)
SQUAMOUS #/AREA URNS AUTO: NORMAL /LPF
TRIGL SERPL-MCNC: 154 MG/DL
TSH SERPL DL<=0.005 MIU/L-ACNC: 9.07 UIU/ML (ref 0.3–4.2)
UROBILINOGEN UR STRIP-ACNC: 0.2 E.U./DL
VIT B12 SERPL-MCNC: 958 PG/ML (ref 232–1245)
VIT D+METAB SERPL-MCNC: 19 NG/ML (ref 20–50)
WBC # BLD AUTO: 6.7 10E3/UL (ref 4–11)
WBC #/AREA URNS AUTO: NORMAL /HPF

## 2025-03-20 RX ORDER — TELMISARTAN 20 MG/1
20 TABLET ORAL DAILY
Qty: 90 TABLET | Refills: 3 | Status: SHIPPED | OUTPATIENT
Start: 2025-03-20

## 2025-03-20 ASSESSMENT — PAIN SCALES - GENERAL: PAINLEVEL_OUTOF10: MODERATE PAIN (6)

## 2025-03-20 NOTE — PROGRESS NOTES
"Preventive Care Visit  [facility]  Natalie Hale NP, Internal Medicine  Mar 20, 2025      Assessment & Plan   Problem List Items Addressed This Visit    None  Visit Diagnoses       Annual physical exam    -  Primary    Relevant Orders    Comprehensive metabolic panel    CBC with platelets and differential    UA Macroscopic with reflex to Microscopic and Culture - Lab Collect    Vitamin B12    Vitamin D deficiency screening    Hypothyroidism, unspecified type        Relevant Orders    TSH WITH FREE T4 REFLEX    Thyroglobulin and Antibody Reflex    Vitamin D deficiency        Low serum testosterone level in male        Psoriatic arthritis (H)        Sleep apnea, unspecified type        Hypercholesteremia        Relevant Orders    Lipid panel reflex to direct LDL Non-fasting    Screening for prostate cancer        Relevant Orders    PSA, screen    Benign essential hypertension        Relevant Medications    telmisartan (MICARDIS) 20 MG tablet    Impacted cerumen of right ear                        BMI  Estimated body mass index is 37.5 kg/m  as calculated from the following:    Height as of this encounter: 1.678 m (5' 6.06\").    Weight as of this encounter: 105.6 kg (232 lb 12.8 oz).   Weight management plan: Discussed healthy diet and exercise guidelines    Counseling  Appropriate preventive services were addressed with this patient via screening, questionnaire, or discussion as appropriate for fall prevention, nutrition, physical activity, Tobacco-use cessation, social engagement, weight loss and cognition.  Checklist reviewing preventive services available has been given to the patient.  Reviewed patient's diet, addressing concerns and/or questions.   The patient was instructed to see the dentist every 6 months.           Christian Jones is a 49 year old, presenting for the following:  Physical (Patient states when taking Thyroid ([place]), blood pressure and heart rate is high. Patient " states he has not taken the medication in 4 days. )        3/20/2025     2:33 PM   Additional Questions   Roomed by Tyrel JONES MA          HPI           Advance Care Planning  Patient does not have a Health Care Directive:       3/16/2025   General Health   How would you rate your overall physical health? (!) FAIR   Feel stress (tense, anxious, or unable to sleep) Not at all         3/16/2025   Nutrition   Three or more servings of calcium each day? Yes   Diet: Carbohydrate counting    Breakfast skipped    Gluten-free/reduced   How many servings of fruit and vegetables per day? (!) 2-3   How many sweetened beverages each day? 0-1       Multiple values from one day are sorted in reverse-chronological order         3/16/2025   Exercise   Days per week of moderate/strenous exercise 5 days   Average minutes spent exercising at this level 60 min         3/16/2025   Social Factors   Frequency of gathering with friends or relatives Once a week   Worry food won't last until get money to buy more No   Food not last or not have enough money for food? No   Do you have housing? (Housing is defined as stable permanent housing and does not include staying ouside in a car, in a tent, in an abandoned building, in an overnight shelter, or couch-surfing.) Yes   Are you worried about losing your housing? No   Lack of transportation? No   Unable to get utilities (heat,electricity)? No         3/16/2025   Dental   Dentist two times every year? (!) NO             Today's PHQ-2 Score:       3/10/2025     3:25 PM   PHQ-2 ( 1999 [employer])   Q1: Little interest or pleasure in doing things 0   Q2: Feeling down, depressed or hopeless 0   PHQ-2 Score 0    Q1: Little interest or pleasure in doing things Not at all   Q2: Feeling down, depressed or hopeless Not at all   PHQ-2 Score 0       Patient-reported         3/16/2025   Substance Use   Alcohol more than 3/day or more than 7/wk Not Applicable   Do you use any other substances recreationally? No      Social History     Tobacco Use    Smoking status: Never    Smokeless tobacco: Never   Vaping Use    Vaping status: Never Used   Substance Use Topics    Alcohol use: Not Currently     Comment: I dont have any desire.    Drug use: Never           3/16/2025   STI Screening   New sexual partner(s) since last STI/HIV test? No   ASCVD Risk   The 10-year ASCVD risk score (Evaristo CHILDERS, et al., 2019) is: 6.7%    Values used to calculate the score:      Age: 49 years      Sex: Male      Is Non- : No      Diabetic: No      Tobacco smoker: No      Systolic Blood Pressure: 158 mmHg      Is BP treated: No      HDL Cholesterol: 47 mg/dL      Total Cholesterol: 268 mg/dL        3/16/2025   Contraception/Family Planning   Questions about contraception or family planning No        Reviewed and updated as needed this visit by Provider                    Past Medical History:   Diagnosis Date    Arthritis 3 years ago    Psoriatic Arthritis (inflammatory arthritis)    Hashimoto's thyroiditis     Hypothyroidism     ARSEN (obstructive sleep apnea)      Past Surgical History:   Procedure Laterality Date    ENT SURGERY  30 years ago    Tubes    HAND SURGERY      INSERT INTRACORONARY STENT       OB History   No obstetric history on file.     BP Readings from Last 3 Encounters:   03/20/25 (!) 158/88   07/27/23 130/68   07/15/22 132/68    Wt Readings from Last 3 Encounters:   03/20/25 105.6 kg (232 lb 12.8 oz)   07/27/23 99.5 kg (219 lb 6.4 oz)   07/15/22 91.8 kg (202 lb 6.4 oz)                  There is no problem list on file for this patient.    Past Surgical History:   Procedure Laterality Date    ENT SURGERY  30 years ago    Tubes    HAND SURGERY      INSERT INTRACORONARY STENT         Social History     Tobacco Use    Smoking status: Never    Smokeless tobacco: Never   Substance Use Topics    Alcohol use: Not Currently     Comment: I dont have any desire.     Family History   Problem Relation Age of Onset  "   Alcoholism Mother     Hypothyroidism Mother     Hypothyroidism Father     Hypertension Father     Sleep Apnea Father     Thyroid Disease Father     Hypothyroidism Sister     Diabetes Type 2  Sister     Multiple Sclerosis Brother          Current Outpatient Medications   Medication Sig Dispense Refill    celecoxib (CELEBREX) 200 MG capsule       folic acid (FOLVITE) 1 MG tablet Take 1,000 mcg by mouth daily      levothyroxine (SYNTHROID/LEVOTHROID) 175 MCG tablet Take 1 tablet (175 mcg) by mouth daily 90 tablet 3    meloxicam (MOBIC) 7.5 MG tablet Take 7.5 mg by mouth 2 times daily      Multiple Vitamins-Minerals (VITAMIN D3 COMPLETE PO)       predniSONE (DELTASONE) 20 MG tablet Take 1 tablet (20 mg) by mouth daily. 5 tablet 0    telmisartan (MICARDIS) 20 MG tablet Take 1 tablet (20 mg) by mouth daily. 90 tablet 3    TESTOSTERONE IN VANICREAM 1%, 10MG /GM, TD CREAM Apply topically 0.4ml (4mg) daily 12 mL 0    thyroid (ARMOUR) 120 MG tablet  (Patient not taking: Reported on 3/20/2025)       No Known Allergies  Recent Labs   Lab Test 07/27/23  0815 07/15/22  0821 03/18/21  1459 12/09/20  0740 09/28/20  0654 02/07/20  1234   A1C 5.9* 5.8*  --   --  5.4 5.2   * 194*  --   --  218*  --    HDL 47 47  --   --  42  --    TRIG 119 122  --   --  149  --    ALT 26 34  --   --  43 50*   CR 1.06 0.98  --   --  1.02 0.85   GFRESTIMATED 88 >90  --   --  >60 >60   GFRESTBLACK  --   --   --   --  >60 >60   POTASSIUM 4.5 4.7  --   --  4.6 4.6   TSH  --   --  0.25* 0.73 2.85  --                Review of Systems  Constitutional, HEENT, cardiovascular, pulmonary, GI, , musculoskeletal, neuro, skin, endocrine and psych systems are negative, except as otherwise noted.     Objective    Exam  BP (!) 158/88   Pulse 71   Temp 97.7  F (36.5  C) (Oral)   Resp 16   Ht 1.678 m (5' 6.06\")   Wt 105.6 kg (232 lb 12.8 oz)   SpO2 98%   BMI 37.50 kg/m     Estimated body mass index is 37.5 kg/m  as calculated from the following:    " "Height as of this encounter: 1.678 m (5' 6.06\").    Weight as of this encounter: 105.6 kg (232 lb 12.8 oz).    Physical Exam  GENERAL: alert and no distress  EYES: Eyes grossly normal to inspection, PERRL and conjunctivae and sclerae normal  HENT: Right canal with cerumen impacted utilization of a curette the cerumen is removed, ear canals and TM's normal, nose and mouth without ulcers or lesions  NECK: no adenopathy, no asymmetry, masses, or scars  RESP: lungs clear to auscultation - no rales, rhonchi or wheezes  CV: regular rate and rhythm, normal S1 S2, no S3 or S4, no murmur, click or rub, no peripheral edema  ABDOMEN: soft, nontender, no hepatosplenomegaly, no masses and bowel sounds normal  MS: no gross musculoskeletal defects noted, no edema  SKIN: no suspicious lesions or rashes  NEURO: Normal strength and tone, mentation intact and speech normal  PSYCH: mentation appears normal, affect normal/bright        Signed Electronically by: Natalie Hale NP    "

## 2025-03-21 LAB — T4 FREE SERPL-MCNC: 1.06 NG/DL (ref 0.9–1.7)

## 2025-04-01 ENCOUNTER — MYC MEDICAL ADVICE (OUTPATIENT)
Dept: INTERNAL MEDICINE | Facility: CLINIC | Age: 50
End: 2025-04-01
Payer: COMMERCIAL

## 2025-04-01 DIAGNOSIS — I10 BENIGN ESSENTIAL HYPERTENSION: ICD-10-CM

## 2025-04-02 ENCOUNTER — MYC REFILL (OUTPATIENT)
Dept: INTERNAL MEDICINE | Facility: CLINIC | Age: 50
End: 2025-04-02
Payer: COMMERCIAL

## 2025-04-02 DIAGNOSIS — I10 BENIGN ESSENTIAL HYPERTENSION: ICD-10-CM

## 2025-04-02 RX ORDER — TELMISARTAN 40 MG/1
40 TABLET ORAL DAILY
Qty: 90 TABLET | Refills: 3 | Status: SHIPPED | OUTPATIENT
Start: 2025-04-02 | End: 2025-04-02

## 2025-04-03 RX ORDER — TELMISARTAN 40 MG/1
40 TABLET ORAL DAILY
Qty: 90 TABLET | Refills: 3 | Status: SHIPPED | OUTPATIENT
Start: 2025-04-03

## 2025-07-17 ENCOUNTER — OFFICE VISIT (OUTPATIENT)
Dept: INTERNAL MEDICINE | Facility: CLINIC | Age: 50
End: 2025-07-17
Payer: COMMERCIAL

## 2025-07-17 VITALS
HEIGHT: 66 IN | HEART RATE: 65 BPM | DIASTOLIC BLOOD PRESSURE: 92 MMHG | BODY MASS INDEX: 39.18 KG/M2 | SYSTOLIC BLOOD PRESSURE: 152 MMHG | WEIGHT: 243.8 LBS | RESPIRATION RATE: 16 BRPM | OXYGEN SATURATION: 98 % | TEMPERATURE: 97.9 F

## 2025-07-17 DIAGNOSIS — E03.9 HYPOTHYROIDISM, UNSPECIFIED TYPE: ICD-10-CM

## 2025-07-17 DIAGNOSIS — I10 BENIGN ESSENTIAL HYPERTENSION: Primary | ICD-10-CM

## 2025-07-17 LAB
T4 FREE SERPL-MCNC: 0.74 NG/DL (ref 0.9–1.7)
TSH SERPL DL<=0.005 MIU/L-ACNC: 6.32 UIU/ML (ref 0.3–4.2)

## 2025-07-17 PROCEDURE — 99214 OFFICE O/P EST MOD 30 MIN: CPT | Performed by: NURSE PRACTITIONER

## 2025-07-17 PROCEDURE — 1125F AMNT PAIN NOTED PAIN PRSNT: CPT | Performed by: NURSE PRACTITIONER

## 2025-07-17 PROCEDURE — 3077F SYST BP >= 140 MM HG: CPT | Performed by: NURSE PRACTITIONER

## 2025-07-17 PROCEDURE — 3080F DIAST BP >= 90 MM HG: CPT | Performed by: NURSE PRACTITIONER

## 2025-07-17 PROCEDURE — 36415 COLL VENOUS BLD VENIPUNCTURE: CPT | Performed by: NURSE PRACTITIONER

## 2025-07-17 PROCEDURE — 84439 ASSAY OF FREE THYROXINE: CPT | Performed by: NURSE PRACTITIONER

## 2025-07-17 PROCEDURE — 84443 ASSAY THYROID STIM HORMONE: CPT | Performed by: NURSE PRACTITIONER

## 2025-07-17 RX ORDER — TESTOSTERONE CYPIONATE 1000 MG/10ML
160 INJECTION, SOLUTION INTRAMUSCULAR WEEKLY
COMMUNITY
Start: 2025-07-17

## 2025-07-17 RX ORDER — TELMISARTAN 80 MG/1
80 TABLET ORAL DAILY
Qty: 90 TABLET | Refills: 1 | Status: SHIPPED | OUTPATIENT
Start: 2025-07-17

## 2025-07-17 ASSESSMENT — PAIN SCALES - GENERAL: PAINLEVEL_OUTOF10: MODERATE PAIN (6)

## 2025-07-17 NOTE — PROGRESS NOTES
"  Assessment & Plan   Problem List Items Addressed This Visit    None  Visit Diagnoses         Benign essential hypertension    -  Primary    Relevant Medications    telmisartan (MICARDIS) 80 MG tablet      Hypothyroidism, unspecified type        Relevant Orders    TSH with free T4 reflex    TSH with free T4 reflex (Completed)    T4 free (Completed)        Blood pressure not currently well controlled, will increase to 80mg daily, TSH elevated, increase armour thryoid 180mg daily on an empty stomach.  Repeat TSH 12 weeks. Return to clinic in 4 weeks for blood pressure check.             Rheumatologist follow up \"growths\" will discuss with them and if unable to evaluate will ultrasound the lesions.                    Christian Jones is a 49 year old, presenting for the following health issues:  Follow Up (Blood pressure and Medication. )        7/17/2025    12:43 PM   Additional Questions   Roomed by Tyrel JONES MA     History of Present Illness       Hypertension: He presents for follow up of hypertension.  He does check blood pressure  regularly outside of the clinic. Outside blood pressures have been over 140/90. He follows a low salt diet.     Hypothyroidism:     Since last visit, patient describes the following symptoms::  Fatigue    Reason for visit:  Weird growth in muscles, BP med check up.    He eats 2-3 servings of fruits and vegetables daily.He consumes 0 sweetened beverage(s) daily.He exercises with enough effort to increase his heart rate 30 to 60 minutes per day.  He exercises with enough effort to increase his heart rate 6 days per week.   He is taking medications regularly.                  Review of Systems  Constitutional, HEENT, cardiovascular, pulmonary, GI, , musculoskeletal, neuro, skin, endocrine and psych systems are negative, except as otherwise noted.      Objective    BP (!) 152/92   Pulse 65   Temp 97.9  F (36.6  C) (Oral)   Resp 16   Ht 1.678 m (5' 6.06\")   Wt 110.6 kg (243 lb 12.8 " oz)   SpO2 98%   BMI 39.28 kg/m    Body mass index is 39.28 kg/m .  Physical Exam   GENERAL: alert and no distress  EYES: Eyes grossly normal to inspection, conjunctivae and sclerae normal  RESP: respirations regular nonlabored   PSYCH: mentation appears normal, affect normal/bright    Office Visit on 03/20/2025   Component Date Value Ref Range Status    TSH 03/20/2025 9.07 (H)  0.30 - 4.20 uIU/mL Final    Cholesterol 03/20/2025 308 (H)  <200 mg/dL Final    Triglycerides 03/20/2025 154 (H)  <150 mg/dL Final    Direct Measure HDL 03/20/2025 46  >=40 mg/dL Final    LDL Cholesterol Calculated 03/20/2025 231 (H)  <100 mg/dL Final    Non HDL Cholesterol 03/20/2025 262 (H)  <130 mg/dL Final    Patient Fasting > 8hrs? 03/20/2025 Yes   Final    Sodium 03/20/2025 138  135 - 145 mmol/L Final    Potassium 03/20/2025 4.4  3.4 - 5.3 mmol/L Final    Carbon Dioxide (CO2) 03/20/2025 26  22 - 29 mmol/L Final    Anion Gap 03/20/2025 12  7 - 15 mmol/L Final    Urea Nitrogen 03/20/2025 4.4 (L)  6.0 - 20.0 mg/dL Final    Creatinine 03/20/2025 1.00  0.67 - 1.17 mg/dL Final    GFR Estimate 03/20/2025 >90  >60 mL/min/1.73m2 Final    eGFR calculated using 2021 CKD-EPI equation.    Calcium 03/20/2025 10.1  8.8 - 10.4 mg/dL Final    Chloride 03/20/2025 100  98 - 107 mmol/L Final    Glucose 03/20/2025 93  70 - 99 mg/dL Final    Alkaline Phosphatase 03/20/2025 47  40 - 150 U/L Final    AST 03/20/2025 33  0 - 45 U/L Final    ALT 03/20/2025 51  0 - 70 U/L Final    Protein Total 03/20/2025 8.1  6.4 - 8.3 g/dL Final    Albumin 03/20/2025 4.9  3.5 - 5.2 g/dL Final    Bilirubin Total 03/20/2025 0.7  <=1.2 mg/dL Final    Patient Fasting > 8hrs? 03/20/2025 Yes   Final    Color Urine 03/20/2025 Yellow  Colorless, Straw, Light Yellow, Yellow Final    Appearance Urine 03/20/2025 Clear  Clear Final    Glucose Urine 03/20/2025 Negative  Negative mg/dL Final    Bilirubin Urine 03/20/2025 Negative  Negative Final    Ketones Urine 03/20/2025 Negative   Negative mg/dL Final    Specific Gravity Urine 03/20/2025 <=1.005  1.005 - 1.030 Final    Blood Urine 03/20/2025 Trace (A)  Negative Final    pH Urine 03/20/2025 6.5  5.0 - 8.0 Final    Protein Albumin Urine 03/20/2025 Negative  Negative mg/dL Final    Urobilinogen Urine 03/20/2025 0.2  0.2, 1.0 E.U./dL Final    Nitrite Urine 03/20/2025 Negative  Negative Final    Leukocyte Esterase Urine 03/20/2025 Negative  Negative Final    Prostate Specific Antigen Screen 03/20/2025 0.98  0.00 - 2.50 ng/mL Final    Thyroglobulin Antibody 03/20/2025 <20  <40 IU/mL Final    Vitamin B12 03/20/2025 958  232 - 1,245 pg/mL Final    Vitamin D, Total (25-Hydroxy) 03/20/2025 19 (L)  20 - 50 ng/mL Final    mild to moderate deficiency    WBC Count 03/20/2025 6.7  4.0 - 11.0 10e3/uL Final    RBC Count 03/20/2025 6.09 (H)  4.40 - 5.90 10e6/uL Final    Hemoglobin 03/20/2025 16.7  13.3 - 17.7 g/dL Final    Hematocrit 03/20/2025 49.7  40.0 - 53.0 % Final    MCV 03/20/2025 82  78 - 100 fL Final    MCH 03/20/2025 27.4  26.5 - 33.0 pg Final    MCHC 03/20/2025 33.6  31.5 - 36.5 g/dL Final    RDW 03/20/2025 14.0  10.0 - 15.0 % Final    Platelet Count 03/20/2025 194  150 - 450 10e3/uL Final    % Neutrophils 03/20/2025 57  % Final    % Lymphocytes 03/20/2025 31  % Final    % Monocytes 03/20/2025 10  % Final    % Eosinophils 03/20/2025 2  % Final    % Basophils 03/20/2025 0  % Final    % Immature Granulocytes 03/20/2025 1  % Final    Absolute Neutrophils 03/20/2025 3.8  1.6 - 8.3 10e3/uL Final    Absolute Lymphocytes 03/20/2025 2.1  0.8 - 5.3 10e3/uL Final    Absolute Monocytes 03/20/2025 0.7  0.0 - 1.3 10e3/uL Final    Absolute Eosinophils 03/20/2025 0.1  0.0 - 0.7 10e3/uL Final    Absolute Basophils 03/20/2025 0.0  0.0 - 0.2 10e3/uL Final    Absolute Immature Granulocytes 03/20/2025 0.0  <=0.4 10e3/uL Final    Bacteria Urine 03/20/2025 None Seen  None Seen /HPF Final    RBC Urine 03/20/2025 0-2  0-2 /HPF /HPF Final    WBC Urine 03/20/2025 None Seen   0-5 /HPF /HPF Final    Squamous Epithelials Urine 03/20/2025 None Seen  None Seen /LPF Final    Free T4 03/20/2025 1.06  0.90 - 1.70 ng/dL Final    Thyroglobulin by Immunoassay 03/20/2025 27.9  <55.0 ng/mL Final           Signed Electronically by: Natalie Hale NP

## 2025-07-20 ENCOUNTER — RESULTS FOLLOW-UP (OUTPATIENT)
Dept: URGENT CARE | Facility: CLINIC | Age: 50
End: 2025-07-20
Payer: COMMERCIAL

## 2025-07-20 DIAGNOSIS — E03.9 HYPOTHYROIDISM, UNSPECIFIED TYPE: Primary | ICD-10-CM

## 2025-07-28 ENCOUNTER — TRANSFERRED RECORDS (OUTPATIENT)
Dept: HEALTH INFORMATION MANAGEMENT | Facility: CLINIC | Age: 50
End: 2025-07-28
Payer: COMMERCIAL

## 2025-07-30 DIAGNOSIS — D17.30 LIPOMA OF SKIN AND SUBCUTANEOUS TISSUE: Primary | ICD-10-CM

## 2025-08-11 ENCOUNTER — OFFICE VISIT (OUTPATIENT)
Dept: SURGERY | Facility: CLINIC | Age: 50
End: 2025-08-11
Payer: COMMERCIAL

## 2025-08-11 ENCOUNTER — HOSPITAL ENCOUNTER (OUTPATIENT)
Facility: AMBULATORY SURGERY CENTER | Age: 50
End: 2025-08-11
Attending: SPECIALIST
Payer: COMMERCIAL

## 2025-08-11 DIAGNOSIS — D17.30 LIPOMA OF SKIN AND SUBCUTANEOUS TISSUE: ICD-10-CM

## 2025-08-11 PROCEDURE — 99243 OFF/OP CNSLTJ NEW/EST LOW 30: CPT | Performed by: SPECIALIST

## 2025-08-11 RX ORDER — ACETAMINOPHEN 325 MG/1
975 TABLET ORAL ONCE
OUTPATIENT
Start: 2025-08-11 | End: 2025-08-11

## 2025-08-19 ENCOUNTER — VIRTUAL VISIT (OUTPATIENT)
Dept: INTERNAL MEDICINE | Facility: CLINIC | Age: 50
End: 2025-08-19
Payer: COMMERCIAL

## 2025-08-19 DIAGNOSIS — I10 BENIGN ESSENTIAL HYPERTENSION: ICD-10-CM

## 2025-08-19 DIAGNOSIS — E03.9 HYPOTHYROIDISM, UNSPECIFIED TYPE: Primary | ICD-10-CM

## 2025-08-19 PROCEDURE — 98005 SYNCH AUDIO-VIDEO EST LOW 20: CPT | Performed by: NURSE PRACTITIONER

## 2025-08-19 PROCEDURE — 1126F AMNT PAIN NOTED NONE PRSNT: CPT | Mod: 95 | Performed by: NURSE PRACTITIONER

## 2025-08-19 RX ORDER — LEVOTHYROXINE SODIUM 175 UG/1
175 TABLET ORAL
Qty: 90 TABLET | Refills: 3 | Status: SHIPPED | OUTPATIENT
Start: 2025-08-19

## 2025-08-19 RX ORDER — LEVOTHYROXINE SODIUM 100 UG/1
100 TABLET ORAL
Qty: 90 TABLET | Refills: 3 | Status: SHIPPED | OUTPATIENT
Start: 2025-08-19 | End: 2025-08-19

## 2025-08-21 ENCOUNTER — OFFICE VISIT (OUTPATIENT)
Dept: INTERNAL MEDICINE | Facility: CLINIC | Age: 50
End: 2025-08-21
Payer: COMMERCIAL

## 2025-08-21 VITALS
BODY MASS INDEX: 38.49 KG/M2 | HEART RATE: 71 BPM | SYSTOLIC BLOOD PRESSURE: 146 MMHG | DIASTOLIC BLOOD PRESSURE: 80 MMHG | WEIGHT: 239.5 LBS | RESPIRATION RATE: 16 BRPM | HEIGHT: 66 IN | OXYGEN SATURATION: 95 %

## 2025-08-21 DIAGNOSIS — R79.89 LOW SERUM TESTOSTERONE LEVEL IN MALE: ICD-10-CM

## 2025-08-21 DIAGNOSIS — G47.30 SLEEP APNEA, UNSPECIFIED TYPE: ICD-10-CM

## 2025-08-21 DIAGNOSIS — D17.30 LIPOMA OF SKIN AND SUBCUTANEOUS TISSUE: ICD-10-CM

## 2025-08-21 DIAGNOSIS — E78.00 HYPERCHOLESTEREMIA: ICD-10-CM

## 2025-08-21 DIAGNOSIS — E03.9 HYPOTHYROIDISM, UNSPECIFIED TYPE: ICD-10-CM

## 2025-08-21 DIAGNOSIS — I10 BENIGN ESSENTIAL HYPERTENSION: ICD-10-CM

## 2025-08-21 DIAGNOSIS — L40.50 PSORIATIC ARTHRITIS (H): ICD-10-CM

## 2025-08-21 DIAGNOSIS — Z01.810 PRE-OPERATIVE CARDIOVASCULAR EXAMINATION: Primary | ICD-10-CM

## 2025-08-21 DIAGNOSIS — E55.9 VITAMIN D DEFICIENCY: ICD-10-CM

## 2025-08-21 LAB
ALBUMIN SERPL BCG-MCNC: 4.5 G/DL (ref 3.5–5.2)
ALP SERPL-CCNC: 39 U/L (ref 40–150)
ALT SERPL W P-5'-P-CCNC: 42 U/L (ref 0–70)
ANION GAP SERPL CALCULATED.3IONS-SCNC: 10 MMOL/L (ref 7–15)
AST SERPL W P-5'-P-CCNC: 28 U/L (ref 0–45)
ATRIAL RATE - MUSE: 69 BPM
BASOPHILS # BLD AUTO: 0.04 10E3/UL (ref 0–0.2)
BASOPHILS NFR BLD AUTO: 0.7 %
BILIRUB SERPL-MCNC: 0.2 MG/DL
BUN SERPL-MCNC: 14.9 MG/DL (ref 6–20)
CALCIUM SERPL-MCNC: 9.4 MG/DL (ref 8.8–10.4)
CHLORIDE SERPL-SCNC: 103 MMOL/L (ref 98–107)
CREAT SERPL-MCNC: 1.07 MG/DL (ref 0.67–1.17)
DIASTOLIC BLOOD PRESSURE - MUSE: NORMAL MMHG
EGFRCR SERPLBLD CKD-EPI 2021: 86 ML/MIN/1.73M2
EOSINOPHIL # BLD AUTO: 0.09 10E3/UL (ref 0–0.7)
EOSINOPHIL NFR BLD AUTO: 1.5 %
ERYTHROCYTE [DISTWIDTH] IN BLOOD BY AUTOMATED COUNT: 13.1 % (ref 10–15)
GLUCOSE SERPL-MCNC: 117 MG/DL (ref 70–99)
HCO3 SERPL-SCNC: 25 MMOL/L (ref 22–29)
HCT VFR BLD AUTO: 45.7 % (ref 40–53)
HGB BLD-MCNC: 15.8 G/DL (ref 13.3–17.7)
IMM GRANULOCYTES # BLD: <0.04 10E3/UL
IMM GRANULOCYTES NFR BLD: 0.2 %
INTERPRETATION ECG - MUSE: NORMAL
LYMPHOCYTES # BLD AUTO: 1.77 10E3/UL (ref 0.8–5.3)
LYMPHOCYTES NFR BLD AUTO: 29.4 %
MCH RBC QN AUTO: 27.9 PG (ref 26.5–33)
MCHC RBC AUTO-ENTMCNC: 34.6 G/DL (ref 31.5–36.5)
MCV RBC AUTO: 80.6 FL (ref 78–100)
MONOCYTES # BLD AUTO: 0.76 10E3/UL (ref 0–1.3)
MONOCYTES NFR BLD AUTO: 12.6 %
NEUTROPHILS # BLD AUTO: 3.36 10E3/UL (ref 1.6–8.3)
NEUTROPHILS NFR BLD AUTO: 55.6 %
P AXIS - MUSE: 33 DEGREES
PLATELET # BLD AUTO: 233 10E3/UL (ref 150–450)
POTASSIUM SERPL-SCNC: 4.3 MMOL/L (ref 3.4–5.3)
PR INTERVAL - MUSE: 146 MS
PROT SERPL-MCNC: 7.3 G/DL (ref 6.4–8.3)
QRS DURATION - MUSE: 84 MS
QT - MUSE: 386 MS
QTC - MUSE: 413 MS
R AXIS - MUSE: 34 DEGREES
RBC # BLD AUTO: 5.67 10E6/UL (ref 4.4–5.9)
SODIUM SERPL-SCNC: 138 MMOL/L (ref 135–145)
SYSTOLIC BLOOD PRESSURE - MUSE: NORMAL MMHG
T AXIS - MUSE: 6 DEGREES
TSH SERPL DL<=0.005 MIU/L-ACNC: 1.8 UIU/ML (ref 0.3–4.2)
VENTRICULAR RATE- MUSE: 69 BPM
WBC # BLD AUTO: 6.03 10E3/UL (ref 4–11)

## 2025-08-21 ASSESSMENT — PAIN SCALES - GENERAL: PAINLEVEL_OUTOF10: NO PAIN (0)
